# Patient Record
Sex: MALE | Race: WHITE | NOT HISPANIC OR LATINO | Employment: FULL TIME | URBAN - METROPOLITAN AREA
[De-identification: names, ages, dates, MRNs, and addresses within clinical notes are randomized per-mention and may not be internally consistent; named-entity substitution may affect disease eponyms.]

---

## 2017-01-31 ENCOUNTER — OFFICE VISIT (OUTPATIENT)
Dept: NEUROLOGY | Facility: MEDICAL CENTER | Age: 42
End: 2017-01-31
Payer: COMMERCIAL

## 2017-01-31 VITALS
HEART RATE: 69 BPM | DIASTOLIC BLOOD PRESSURE: 78 MMHG | BODY MASS INDEX: 30.88 KG/M2 | TEMPERATURE: 98.4 F | SYSTOLIC BLOOD PRESSURE: 116 MMHG | OXYGEN SATURATION: 97 % | HEIGHT: 72 IN | WEIGHT: 228 LBS

## 2017-01-31 DIAGNOSIS — G47.30 SLEEP APNEA, UNSPECIFIED TYPE: ICD-10-CM

## 2017-01-31 DIAGNOSIS — G43.109 MIGRAINE WITH AURA AND WITHOUT STATUS MIGRAINOSUS, NOT INTRACTABLE: ICD-10-CM

## 2017-01-31 PROCEDURE — 99205 OFFICE O/P NEW HI 60 MIN: CPT | Performed by: PHYSICIAN ASSISTANT

## 2017-01-31 RX ORDER — PROMETHAZINE HYDROCHLORIDE 25 MG/1
TABLET ORAL
Qty: 20 TAB | Refills: 6 | Status: SHIPPED | OUTPATIENT
Start: 2017-01-31 | End: 2019-07-01

## 2017-01-31 RX ORDER — NAPROXEN 500 MG/1
TABLET ORAL
Qty: 20 TAB | Refills: 6 | Status: SHIPPED | OUTPATIENT
Start: 2017-01-31 | End: 2019-07-01

## 2017-01-31 NOTE — MR AVS SNAPSHOT
Buddy Dash   2017 10:40 AM   Office Visit   MRN: [id number]    Department:  Neurology [facility]   Dept Phone:  [phone]    Description:  Male : 1975   Provider:  Thania Swift PA-C           Reason for Visit     New Patient migraine      Allergies as of 2017     Allergen Noted Reactions    Nkda [No Known Drug Allergy] 2012         Vital Signs     Blood Pressure Pulse Temperature Height Weight Body Mass Index    116/78 mmHg 69 36.9 °C (98.4 °F) 1.829 m (6') 103.42 kg (228 lb) 30.92 kg/m2    Oxygen Saturation Smoking Status                97% Never Smoker           Basic Information     Date Of Birth Sex Race Ethnicity Preferred Language    1975 Male White Non- English      Your appointments     Mar 30, 2017 11:00 AM   Follow Up Visit with Thania Swift PA-C   [facility] (--)    [address], [address]  [facility] [address] [phone]           You will be receiving a confirmation call a few days before your appointment from our automated call confirmation system.              Problem List              ICD-10-CM Priority Class Noted - Resolved    Thoracic or lumbosacral neuritis or radiculitis, unspecified [id number]   2012 - Present    S/P spinal surgery Z98.890   2012 - Present    Radiculopathy M54.10   2012 - Present      Health Maintenance     Patient has no pending health maintenance at this time      Current Immunizations     No immunizations on file.      Below and/or attached are the medications your provider expects you to take. Review all of your home medications and newly ordered medications with your provider and/or pharmacist. Follow medication instructions as directed by your provider and/or pharmacist. Please keep your medication list with you and share with your provider. Update the information when medications are discontinued, doses are changed, or new medications (including over-the-counter products) are  added; and carry medication information at all times in the event of emergency situations     Allergies:  NKDA - (reactions not documented)               Medications  Valid as of: January 31, 2017 - 12:03 PM    Generic Name Brand Name Tablet Size Instructions for use    Cyclobenzaprine HCl (Tab) FLEXERIL 5 MG Take 1-2 Tabs by mouth 3 times a day as needed for Muscle Spasms.        Famotidine (Tab) PEPCID 20 MG Take 1 Tab by mouth 2 times a day. Take while on sterioids        Hydrocodone-Acetaminophen (Tab) NORCO 7.5-325 MG Take 1-2 Tabs by mouth every four hours as needed ((Pain scale 4-6)).        Magnesium Hydroxide (Suspension) MILK OF MAGNESIA 400 MG/5ML Take 30 mL by mouth 1 time daily as needed (If no BM ).        Multiple Vitamins-Minerals (Tab) THERAGRAN-M  Take 1 Tab by mouth every day.        Sennosides-Docusate Sodium (Tab) PERICOLACE or SENOKOT S 8.6-50 MG Take 2 Tabs by mouth every day.        SUMAtriptan Succinate (Tab) IMITREX 50 MG Take 1 Tab by mouth every 8 hours as needed for Migraine.        .                 Medicines prescribed today were sent to:     None      Medication refill instructions:       If your prescription bottle indicates you have medication refills left, it is not necessary to call your provider’s office. Please contact your pharmacy and they will refill your medication.    If your prescription bottle indicates you do not have any refills left, you may request refills at any time through one of the following ways: The online [employer] system (except Urgent Care), by calling your provider’s office, or by asking your pharmacy to contact your provider’s office with a refill request. Medication refills are processed only during regular business hours and may not be available until the next business day. Your provider may request additional information or to have a follow-up visit with you prior to refilling your medication.   *Please Note: Medication refills are assigned a new Rx  number when refilled electronically. Your pharmacy may indicate that no refills were authorized even though a new prescription for the same medication is available at the pharmacy. Please request the medicine by name with the pharmacy before contacting your provider for a refill.        Instructions    Plan:    Acute/Rescue Medications:  Triptan - onzetra at start of headache, can repeat once 2 hours later   Nausea medication -  Phenergan   NSAID  - naproxen 500 mg available to take with triptan or without    Daily Preventative Treatment:  Vitamins - handout provided  Daily medicine - will consider later if > 2 days weekly  Exercise program:  Walking/yoga    Other:  ONB - handout provided.    (Refer to dentist for TMJ evaluation)  Referral to pulmonolgy for possible sleep apnea            NMRKT Access Code: [email or web address]  Expires: 3/2/2017 10:33 AM    Your email address is not on file at [employer].  Email Addresses are required for you to sign up for NMRKT, please contact [phone] to verify your personal information and to provide your email address prior to attempting to register for NMRKT.    Buddy Dash  [address] [place] [address]    NMRKT  A secure, online tool to manage your health information     [employer]’s NMRKT® is a secure, online tool that connects you to your personalized health information from the privacy of your home -- day or night - making it very easy for you to manage your healthcare. Once the activation process is completed, you can even access your medical information using the NMRKT chiara, which is available for free in the [employer] Chi[employer] store or Google Play store.     To learn more about NMRKT, visit [email or web address]    There are two levels of access available (as shown below):   My Chart Features  [facility]  Specialists [facility]  Urgent  Care Non-[facility]   Email your healthcare team securely and  privately 24/7 X X X    Manage appointments: schedule your next appointment; view details of past/upcoming appointments X      Request prescription refills. X      View recent personal medical records, including lab and immunizations X X X X   View health record, including health history, allergies, medications X X X X   Read reports about your outpatient visits, procedures, consult and ER notes X X X X   See your discharge summary, which is a recap of your hospital and/or ER visit that includes your diagnosis, lab results, and care plan X X  X     How to register for [employer]:  Once your e-mail address has been verified, follow the following steps to sign up for [employer].     1. Go to  [email or web address]  2. Click on the Sign Up Now box, which takes you to the New Member Sign Up page. You will need to provide the following information:  a. Enter your [employer] Access Code exactly as it appears at the top of this page. (You will not need to use this code after you’ve completed the sign-up process. If you do not sign up before the expiration date, you must request a new code.)   b. Enter your date of birth.   c. Enter your home email address.   d. Click Submit, and follow the next screen’s instructions.  3. Create a [employer] ID. This will be your [employer] login ID and cannot be changed, so think of one that is secure and easy to remember.  4. Create a [employer] password. You can change your password at any time.  5. Enter your Password Reset Question and Answer. This can be used at a later time if you forget your password.   6. Enter your e-mail address. This allows you to receive e-mail notifications when new information is available in [employer].  7. Click Sign Up. You can now view your health information.    For assistance activating your [employer] account, call [phone]

## 2017-01-31 NOTE — Clinical Note
Can u call pt and find out pharmacy and then call or fax rx x 3 there - they should all be printed off.  Thanks, N.

## 2017-01-31 NOTE — PATIENT INSTRUCTIONS
Plan:    Acute/Rescue Medications:  Triptan - onzetra at start of headache, can repeat once 2 hours later   Nausea medication -  Phenergan   NSAID  - naproxen 500 mg available to take with triptan or without    Daily Preventative Treatment:  Vitamins - handout provided  Daily medicine - will consider later if > 2 days weekly  Exercise program:  Walking/yoga    Other:  ONB - handout provided.    (Refer to dentist for TMJ evaluation)  Referral to pulmonolgy for possible sleep apnea
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
## 2017-02-24 ENCOUNTER — SLEEP CENTER VISIT (OUTPATIENT)
Dept: SLEEP MEDICINE | Facility: MEDICAL CENTER | Age: 42
End: 2017-02-24
Payer: COMMERCIAL

## 2017-02-24 VITALS
RESPIRATION RATE: 16 BRPM | OXYGEN SATURATION: 96 % | BODY MASS INDEX: 31.15 KG/M2 | DIASTOLIC BLOOD PRESSURE: 88 MMHG | SYSTOLIC BLOOD PRESSURE: 132 MMHG | HEART RATE: 60 BPM | HEIGHT: 72 IN | TEMPERATURE: 98.1 F | WEIGHT: 230 LBS

## 2017-02-24 DIAGNOSIS — G43.109 MIGRAINE WITH AURA AND WITHOUT STATUS MIGRAINOSUS, NOT INTRACTABLE: ICD-10-CM

## 2017-02-24 DIAGNOSIS — G47.33 OSA (OBSTRUCTIVE SLEEP APNEA): ICD-10-CM

## 2017-02-24 PROCEDURE — 99204 OFFICE O/P NEW MOD 45 MIN: CPT | Performed by: NURSE PRACTITIONER

## 2017-02-24 RX ORDER — AMOXICILLIN AND CLAVULANATE POTASSIUM 875; 125 MG/1; MG/1
1 TABLET, FILM COATED ORAL
Refills: 0 | COMMUNITY
Start: 2016-12-12 | End: 2017-11-16

## 2017-02-24 NOTE — PROGRESS NOTES
Chief Complaint   Patient presents with   • New Patient         HPI: This patient is a 41 y.o. male, who presents for evaluation and management of suspected sleep-disordered breathing. He was recently seen in consultation for chronic migraine headaches with Thania VALENTINO, questioning during that visit elicited possible symptoms of sleep apnea. Patient is a never smoker, no prescription drug use, one alcoholic beverage per week 1-2 cups of coffee per day. only other medical history is back pain and bruxism. He has no pulmonary or cardiac history. Sleep symptoms include restlessness at night, non-restful sleep, daytime fatigue. He's been told that he snores and stops breathing at night. He complains of morning headaches. He nods off easily during quiet moments of the day. He typically goes to bed about 10 PM and wakes at 4:30 AM for work.  He falls asleep fairly quickly but wakes frequently throughout the night. He denies symptoms of narcolepsy or cataplexy. ESS 13. His wife notes his snoring is worse when he gains weight.    Past Medical History   Diagnosis Date   • Other specified disorder of intestines      constipation   • Urinary bladder disorder      low flow   • Pain      back   • Back pain    • Bruxism    • Sleep apnea        Social History   Substance Use Topics   • Smoking status: Never Smoker    • Smokeless tobacco: Never Used   • Alcohol Use: Yes      Comment: rare        Family History   Problem Relation Age of Onset   • Sleep Apnea Mother    • Hypertension Paternal Grandmother        Current medications as of today   Current Outpatient Prescriptions   Medication Sig Dispense Refill   • SUMAtriptan Succinate (ONZETRA XSAIL) 11 MG/NOSEPC Exhaler Powder Spray 1 Dose in nose as needed. 6 Each 3   • naproxen (NAPROSYN) 500 MG Tab One tablet every 8 hours for severe migraine. 20 Tab 6   • promethazine (PHENERGAN) 25 MG Tab 25 mg phenergan once on day with severe migraine.  Will cause sleepiness.  Do not  drive. 20 Tab 6   • amoxicillin-clavulanate (AUGMENTIN) 875-125 MG Tab Take 1 Tab by mouth. FOR 7 DAYS  0   • cyclobenzaprine (FLEXERIL) 5 MG tablet Take 1-2 Tabs by mouth 3 times a day as needed for Muscle Spasms. 40 Tab 0   • hydrocodone-acetaminophen (NORCO) 7.5-325 MG per tablet Take 1-2 Tabs by mouth every four hours as needed ((Pain scale 4-6)). 90 Tab 0   • magnesium hydroxide (MILK OF MAGNESIA) 400 MG/5ML SUSP Take 30 mL by mouth 1 time daily as needed (If no BM ).     • senna-docusate (PERICOLACE OR SENOKOT S) 8.6-50 MG TABS Take 2 Tabs by mouth every day.     • sumatriptan (IMITREX) 50 MG TABS Take 1 Tab by mouth every 8 hours as needed for Migraine. 30 Each 0   • famotidine (PEPCID) 20 MG TABS Take 1 Tab by mouth 2 times a day. Take while on sterioids 10 Each 0   • therapeutic multivitamin-minerals (THERAGRAN-M) TABS Take 1 Tab by mouth every day.       No current facility-administered medications for this visit.       Allergies: Nkda    Blood pressure 132/88, pulse 60, temperature 36.7 °C (98.1 °F), resp. rate 16, height 1.829 m (6'), weight 104.327 kg (230 lb), SpO2 96 %.      ROS:   Constitutional: Denies fevers, chills, night sweats, weight loss. Positive for fatigue.  HEENT: Denies earache, difficulty hearing, tinnitus, nasal congestion, hoarseness  Cardiovascular: Denies chest pain, tightness, palpitations, orthopnea or edema  Respiratory: Denies cough, wheeze, dyspnea, hemoptysis  Sleep: See HPI  GI: Denies heartburn, dysphagia, nausea, abdominal pain, diarrhea or constipation  : Denies frequent urination, hematuria, discharge or painful urination  Musculoskeletal: Denies back pain, painful joints, sore muscles  Neurological: Denies weakness or headaches  Skin: No rashes    Physical exam:   Appearance: Well-nourished, well-developed, in no acute distress  HEENT: Normocephalic, atraumatic, white sclera, PERRLA  Respiratory: no intercostal retractions or accessory muscle use   Lungs auscultation:  Clear to auscultation bilaterally  Cardiovascular: Regular rate rhythm no murmurs, rubs or gallops  Gait: Normal  Digits: No clubbing, cyanosis  Motor: No focal deficits  Orientation: Oriented to time, person and place    Diagnosis:  1. MILLA (obstructive sleep apnea)  POLYSOMNOGRAPHY, 4 OR MORE   2. Migraine with aura and without status migrainosus, not intractable     3. BMI 31.0-31.9,adult         Plan:  I had a detailed discussion with the patient and his wife today regarding the pathophysiology of obstructive sleep apnea as well as potential cardiac and neurologic risks associated with untreated sleep apnea. Nocturnal hypoxemia at night secondary to undiagnosed sleep apnea could be contributing to his chronic migraines. We discussed treatment options including CPAP, dental appliance, ENT referral. He is not interested in surgery. His mother has CPAP so he is somewhat familiar with this.    1. Diagnostic PSG ASAP  2. Follow-up after sleep study to review

## 2017-02-24 NOTE — MR AVS SNAPSHOT
Buddy Dash   2017 11:20 AM   Sleep Center Visit   MRN: [id number]    Department:  [facility]   Dept Phone:  [phone]    Description:  Male : 1975   Provider:  TITO Miller           Reason for Visit     New Patient           Allergies as of 2017     Allergen Noted Reactions    Nkda [No Known Drug Allergy] 2012         You were diagnosed with     MILLA (obstructive sleep apnea)   [142559]       Migraine with aura and without status migrainosus, not intractable   [292958]       BMI 31.0-31.9,adult   [083248]         Vital Signs     Blood Pressure Pulse Temperature Respirations Height Weight    132/88 mmHg 60 36.7 °C (98.1 °F) 16 1.829 m (6') 104.327 kg (230 lb)    Body Mass Index Oxygen Saturation Smoking Status             31.19 kg/m2 96% Never Smoker          Basic Information     Date Of Birth Sex Race Ethnicity Preferred Language    1975 Male White Non- English      Your appointments     Mar 30, 2017 11:00 AM   Follow Up Visit with Thania Swift PA-C   [facility] (--)    [address], [address]  [employer] [id number] [phone]           You will be receiving a confirmation call a few days before your appointment from our automated call confirmation system.            2017  8:00 PM   Sleep Study Diagnostic with SLEEP TECH   [facility] (--)    [address]  [facility]  [employer] [id number] [phone]            2017 10:40 AM   Follow UP with TITO Miller   [facility] (--)    [address]  [facility]  [employer] [id number] [phone]              Problem List              ICD-10-CM Priority Class Noted - Resolved    Thoracic or lumbosacral neuritis or radiculitis, unspecified [id number]   2012 - Present    S/P spinal surgery Z98.890   2012 - Present    Radiculopathy M54.10   2012 - Present    Migraine with  aura and without status migrainosus, not intractable G43.109   1/31/2017 - Present    MILLA (obstructive sleep apnea)-presumed G47.33   2/24/2017 - Present    BMI 31.0-31.9,adult Z68.31   2/24/2017 - Present      Health Maintenance        Date Due Completion Dates    IMM DTaP/Tdap/Td Vaccine (1 - Tdap) 2/28/1994 ---    IMM INFLUENZA (1) 9/1/2016 ---            Current Immunizations     No immunizations on file.      Below and/or attached are the medications your provider expects you to take. Review all of your home medications and newly ordered medications with your provider and/or pharmacist. Follow medication instructions as directed by your provider and/or pharmacist. Please keep your medication list with you and share with your provider. Update the information when medications are discontinued, doses are changed, or new medications (including over-the-counter products) are added; and carry medication information at all times in the event of emergency situations     Allergies:  NKDA - (reactions not documented)               Medications  Valid as of: February 24, 2017 - 11:59 AM    Generic Name Brand Name Tablet Size Instructions for use    Amoxicillin-Pot Clavulanate (Tab) AUGMENTIN 875-125 MG Take 1 Tab by mouth. FOR 7 DAYS        Cyclobenzaprine HCl (Tab) FLEXERIL 5 MG Take 1-2 Tabs by mouth 3 times a day as needed for Muscle Spasms.        Famotidine (Tab) PEPCID 20 MG Take 1 Tab by mouth 2 times a day. Take while on sterioids        Hydrocodone-Acetaminophen (Tab) NORCO 7.5-325 MG Take 1-2 Tabs by mouth every four hours as needed ((Pain scale 4-6)).        Magnesium Hydroxide (Suspension) MILK OF MAGNESIA 400 MG/5ML Take 30 mL by mouth 1 time daily as needed (If no BM ).        Multiple Vitamins-Minerals (Tab) THERAGRAN-M  Take 1 Tab by mouth every day.        Naproxen (Tab) NAPROSYN 500 MG One tablet every 8 hours for severe migraine.        Promethazine HCl (Tab) PHENERGAN 25 MG 25 mg phenergan once on day  with severe migraine.  Will cause sleepiness.  Do not drive.        Sennosides-Docusate Sodium (Tab) PERICOLACE or SENOKOT S 8.6-50 MG Take 2 Tabs by mouth every day.        SUMAtriptan Succinate (Tab) IMITREX 50 MG Take 1 Tab by mouth every 8 hours as needed for Migraine.        SUMAtriptan Succinate (Exhaler Powder) SUMAtriptan Succinate 11 MG/NOSEPC Spray 1 Dose in nose as needed.        .                 Medicines prescribed today were sent to:     [facility], [place] - [address] [facility]    [address] [facility] [address]    Phone: [phone] Fax: [phone]    Open 24 Hours?: No      Medication refill instructions:       If your prescription bottle indicates you have medication refills left, it is not necessary to call your provider’s office. Please contact your pharmacy and they will refill your medication.    If your prescription bottle indicates you do not have any refills left, you may request refills at any time through one of the following ways: The online [employer] system (except Urgent Care), by calling your provider’s office, or by asking your pharmacy to contact your provider’s office with a refill request. Medication refills are processed only during regular business hours and may not be available until the next business day. Your provider may request additional information or to have a follow-up visit with you prior to refilling your medication.   *Please Note: Medication refills are assigned a new Rx number when refilled electronically. Your pharmacy may indicate that no refills were authorized even though a new prescription for the same medication is available at the pharmacy. Please request the medicine by name with the pharmacy before contacting your provider for a refill.        Your To Do List     Future Labs/Procedures Complete By Expires    POLYSOMNOGRAPHY, 4 OR MORE  As directed 2/24/2018      Instructions    1. Diagnostic sleep study ASAP  2.  Follow-up with me to review results          ContentRealtime Access Code: [email or web address]  Expires: 3/2/2017 10:33 AM    Your email address is not on file at [facility].  Email Addresses are required for you to sign up for ContentRealtime, please contact [phone] to verify your personal information and to provide your email address prior to attempting to register for ContentRealtime.    Buddy Dash  [address] [place] [address]    ContentRealtime  A secure, online tool to manage your health information     [facility]’s ContentRealtime® is a secure, online tool that connects you to your personalized health information from the privacy of your home -- day or night - making it very easy for you to manage your healthcare. Once the activation process is completed, you can even access your medical information using the ContentRealtime chiara, which is available for free in the Apple Chiara store or Google Play store.     To learn more about ContentRealtime, visit [email or web address]    There are two levels of access available (as shown below):   My Chart Features  [facility] Primary Care Doctor [facility]  Specialists [facility]  Urgent  Care Non-[facility] Primary Care Doctor   Email your healthcare team securely and privately 24/7 X X X    Manage appointments: schedule your next appointment; view details of past/upcoming appointments X      Request prescription refills. X      View recent personal medical records, including lab and immunizations X X X X   View health record, including health history, allergies, medications X X X X   Read reports about your outpatient visits, procedures, consult and ER notes X X X X   See your discharge summary, which is a recap of your hospital and/or ER visit that includes your diagnosis, lab results, and care plan X X  X     How to register for ContentRealtime:  Once your e-mail address has been verified, follow the following steps to sign up for ContentRealtime.     1. Go to  [email or web address]  2. Click on the Sign Up Now box, which  takes you to the New Member Sign Up page. You will need to provide the following information:  a. Enter your [employer] Chip Access Code exactly as it appears at the top of this page. (You will not need to use this code after you’ve completed the sign-up process. If you do not sign up before the expiration date, you must request a new code.)   b. Enter your date of birth.   c. Enter your home email address.   d. Click Submit, and follow the next screen’s instructions.  3. Create a Q Chip ID. This will be your Q Chip login ID and cannot be changed, so think of one that is secure and easy to remember.  4. Create a Q Chip password. You can change your password at any time.  5. Enter your Password Reset Question and Answer. This can be used at a later time if you forget your password.   6. Enter your e-mail address. This allows you to receive e-mail notifications when new information is available in Q Chip.  7. Click Sign Up. You can now view your health information.    For assistance activating your Q Chip account, call [phone]

## 2017-03-07 ENCOUNTER — RX ONLY (OUTPATIENT)
Age: 42
Setting detail: RX ONLY
End: 2017-03-07

## 2017-03-21 PROBLEM — D49.2 NEOPLASM OF UNSPECIFIED BEHAVIOR OF BONE, SOFT TISSUE, AND SKIN: Status: RESOLVED | Noted: 2017-03-07 | Resolved: 2017-03-21

## 2017-03-30 ENCOUNTER — APPOINTMENT (OUTPATIENT)
Dept: NEUROLOGY | Facility: MEDICAL CENTER | Age: 42
End: 2017-03-30
Payer: COMMERCIAL

## 2017-04-21 ENCOUNTER — SLEEP STUDY (OUTPATIENT)
Dept: SLEEP MEDICINE | Facility: MEDICAL CENTER | Age: 42
End: 2017-04-21
Attending: NURSE PRACTITIONER
Payer: COMMERCIAL

## 2017-04-21 DIAGNOSIS — G47.33 OSA (OBSTRUCTIVE SLEEP APNEA): ICD-10-CM

## 2017-04-21 PROCEDURE — 95811 POLYSOM 6/>YRS CPAP 4/> PARM: CPT | Performed by: INTERNAL MEDICINE

## 2017-04-21 NOTE — MR AVS SNAPSHOT
Buddy Dash   2017 8:00 PM   Sleep Study   MRN: [id number]    Department:  [facility]   Dept Phone:  [phone]    Description:  Male : 1975   Provider:  Naif Gee M.D.           Allergies as of 2017     Allergen Noted Reactions    Nkda [No Known Drug Allergy] 2012         You were diagnosed with     MILLA (obstructive sleep apnea)   [338270]         Vital Signs     Smoking Status                   Never Smoker            Basic Information     Date Of Birth Sex Race Ethnicity Preferred Language    1975 Male White Non- English      Your appointments     2017 10:40 AM   Follow UP with TITO Miller   [facility] (--)    [address]  [place] [address]   [phone]              Problem List              ICD-10-CM Priority Class Noted - Resolved    Thoracic or lumbosacral neuritis or radiculitis, unspecified [id number]   2012 - Present    S/P spinal surgery Z98.890   2012 - Present    Radiculopathy M54.10   2012 - Present    Migraine with aura and without status migrainosus, not intractable G43.109   2017 - Present    MILLA (obstructive sleep apnea)-presumed G47.33   2017 - Present    BMI 31.0-31.9,adult Z68.31   2017 - Present      Health Maintenance        Date Due Completion Dates    IMM DTaP/Tdap/Td Vaccine (1 - Tdap) 1994 ---            Current Immunizations     No immunizations on file.      Below and/or attached are the medications your provider expects you to take. Review all of your home medications and newly ordered medications with your provider and/or pharmacist. Follow medication instructions as directed by your provider and/or pharmacist. Please keep your medication list with you and share with your provider. Update the information when medications are discontinued, doses are changed, or new medications (including over-the-counter products) are  added; and carry medication information at all times in the event of emergency situations     Allergies:  NKDA - (reactions not documented)               Medications  Valid as of: April 22, 2017 -  6:15 AM    Generic Name Brand Name Tablet Size Instructions for use    Amoxicillin-Pot Clavulanate (Tab) AUGMENTIN 875-125 MG Take 1 Tab by mouth. FOR 7 DAYS        Cyclobenzaprine HCl (Tab) FLEXERIL 5 MG Take 1-2 Tabs by mouth 3 times a day as needed for Muscle Spasms.        Famotidine (Tab) PEPCID 20 MG Take 1 Tab by mouth 2 times a day. Take while on sterioids        Hydrocodone-Acetaminophen (Tab) NORCO 7.5-325 MG Take 1-2 Tabs by mouth every four hours as needed ((Pain scale 4-6)).        Magnesium Hydroxide (Suspension) MILK OF MAGNESIA 400 MG/5ML Take 30 mL by mouth 1 time daily as needed (If no BM ).        Multiple Vitamins-Minerals (Tab) THERAGRAN-M  Take 1 Tab by mouth every day.        Naproxen (Tab) NAPROSYN 500 MG One tablet every 8 hours for severe migraine.        Promethazine HCl (Tab) PHENERGAN 25 MG 25 mg phenergan once on day with severe migraine.  Will cause sleepiness.  Do not drive.        Sennosides-Docusate Sodium (Tab) PERICOLACE or SENOKOT S 8.6-50 MG Take 2 Tabs by mouth every day.        SUMAtriptan Succinate (Tab) IMITREX 50 MG Take 1 Tab by mouth every 8 hours as needed for Migraine.        SUMAtriptan Succinate (Exhaler Powder) SUMAtriptan Succinate 11 MG/NOSEPC Spray 1 Dose in nose as needed.        .                 Medicines prescribed today were sent to:     [facility] [place], [place] - [address] [facility]    [address] [facility] [address]    Phone: [phone] Fax: [phone]    Open 24 Hours?: No      Medication refill instructions:       If your prescription bottle indicates you have medication refills left, it is not necessary to call your provider’s office. Please contact your pharmacy and they will refill your medication.    If your prescription  bottle indicates you do not have any refills left, you may request refills at any time through one of the following ways: The online Huoli system (except Urgent Care), by calling your provider’s office, or by asking your pharmacy to contact your provider’s office with a refill request. Medication refills are processed only during regular business hours and may not be available until the next business day. Your provider may request additional information or to have a follow-up visit with you prior to refilling your medication.   *Please Note: Medication refills are assigned a new Rx number when refilled electronically. Your pharmacy may indicate that no refills were authorized even though a new prescription for the same medication is available at the pharmacy. Please request the medicine by name with the pharmacy before contacting your provider for a refill.           Huoli Access Code: [email or web address]  Expires: 5/2/2017 10:32 AM    Your email address is not on file at [facility].  Email Addresses are required for you to sign up for Huoli, please contact [phone] to verify your personal information and to provide your email address prior to attempting to register for Huoli.    Buddy Dash  [address] [place] [address]    Huoli  A secure, online tool to manage your health information     [facility]’s Huoli® is a secure, online tool that connects you to your personalized health information from the privacy of your home -- day or night - making it very easy for you to manage your healthcare. Once the activation process is completed, you can even access your medical information using the Huoli chiara, which is available for free in the Apple Chiara store or Google Play store.     To learn more about Huoli, visit [email or web address]    There are two levels of access available (as shown below):   My Chart Features  [facility] Primary Care Doctor [facility]  Specialists  [facility]  Urgent  Care Non-[facility] Primary Care Doctor   Email your healthcare team securely and privately 24/7 X X X    Manage appointments: schedule your next appointment; view details of past/upcoming appointments X      Request prescription refills. X      View recent personal medical records, including lab and immunizations X X X X   View health record, including health history, allergies, medications X X X X   Read reports about your outpatient visits, procedures, consult and ER notes X X X X   See your discharge summary, which is a recap of your hospital and/or ER visit that includes your diagnosis, lab results, and care plan X X  X     How to register for M-KOPA:  Once your e-mail address has been verified, follow the following steps to sign up for M-KOPA.     1. Go to  [email or web address]  2. Click on the Sign Up Now box, which takes you to the New Member Sign Up page. You will need to provide the following information:  a. Enter your M-KOPA Access Code exactly as it appears at the top of this page. (You will not need to use this code after you’ve completed the sign-up process. If you do not sign up before the expiration date, you must request a new code.)   b. Enter your date of birth.   c. Enter your home email address.   d. Click Submit, and follow the next screen’s instructions.  3. Create a M-KOPA ID. This will be your M-KOPA login ID and cannot be changed, so think of one that is secure and easy to remember.  4. Create a M-KOPA password. You can change your password at any time.  5. Enter your Password Reset Question and Answer. This can be used at a later time if you forget your password.   6. Enter your e-mail address. This allows you to receive e-mail notifications when new information is available in M-KOPA.  7. Click Sign Up. You can now view your health information.    For assistance activating your M-KOPA account, call [phone]

## 2017-04-24 NOTE — PROCEDURES
CLINICAL COMMENTS:  The patient underwent a split night polysomnogram with a CPAP titration using the standard montage for measurement of parameters of sleep, respiratory events, movement abnormalities, heart rate and rhythm. A microphone was used to monitor snoring.    INTERPRETATION: The diagnostic recording time was 157.0 minutes with a sleep period of 153.6 minutes.  Total sleep time was 134.1 minutes with a sleep efficiency of 85.4%.  The sleep latency was 3.5 minutes, and REM latency was 133.0 minutes.  The patient had 83 arousals in total, for an arousal index of 37.1.        RESPIRATORY: The patient had 1 apneas in total.  Of these, 1 were obstructive apneas, and 0 were central apneas.  This resulted in an apnea index (AI) of 0.4.  The patient had 154 hypopneas in total, which resulted in a hypopnea index of 68.9.  The overall AHI was 69.4, while the AHI during REM was 80.0.  The supine AHI = 111.1.    OXIMETRY: Oxygen saturation monitoring showed a mean SpO2 of 89.7% for the diagnostic part of the study, with a minimum oxygen saturation of 76.0%.  Oxygen saturations were below 89% for 36.8% of sleep time.    CARDIAC: The highest heart rate for the first part of the study was 98.0 beats per minute.  The average heart rate during sleep was 69.2 bpm, while the highest heart rate was 95.0 bpm.    LIMB MOVEMENTS: There were a total of 113 periodic limb movements during sleep, of which 6 were PLMS arousals.  This resulted in a PLMS index of 50.6 and a PLMS arousal index of 2.7.    TREATMENT    Treatment recording time was 338.4 minutes with a total sleep time of 292.4min.  The patient had an arousal index of 7.4.      RESPIRATORY: The patient had 0 obstructive apneas, 3 central apneas, and 44 hypopneas for an overall AHI was 9.6.    OXIMETRY: The mean SpO2 during treatment was 92.0%, with a minimum oxygen saturation of 87.0%.      CPAP was tried from 5cm to [id number].    Impression:    This was an overnight  diagnostic/therapeutic polysomnogram.    The patient experienced a slightly reduced sleep efficiency to 85.4%, had one REM period, 19.5 minutes of wake after sleep onset, 38 minutes of stage I sleep, 59.6 minutes of stage II sleep, 23 minutes of stage III sleep, and 13.5 minutes of REM with a reduced sleep latency of 3.5 minutes. The patient had an elevated limb movement index of 50.6 but a limb movement with arousal index of only 2.7.    Severe obstructive sleep apnea hypopnea was found. The AHI was 69.4, the mean event duration 20.7 seconds, and the longest event lasted 53.2 seconds. REM and the supine position both aggravated the sleep disordered breathing. The REM index was 80.0. The supine sleep index was 112.1. The lowest saturation during sleep was 76.0% and saturations were less than 90% for 59.4% of the recording.    Once the patient met the split-night protocol, the technician performed a positive airway pressure titration. The patient did well event-wise on CPAP at 5 and 6 cm with normalized apnea hypopnea indices but not as well on higher pressures. On CPAP at 6 cm the patient was able to achieve REM sleep, had an AHI of 3.2, a minimum saturation of 87%, and a mean saturation of 91.7%.    Recommendation:    Recommend CPAP at 6 cm using a mask of choice and heated humidification followed by clinical and compliance card review in 8 weeks. Alternatively, auto titrating CPAP 5-12 cm is an option.

## 2017-04-26 ENCOUNTER — SLEEP CENTER VISIT (OUTPATIENT)
Dept: SLEEP MEDICINE | Facility: MEDICAL CENTER | Age: 42
End: 2017-04-26
Payer: COMMERCIAL

## 2017-04-26 VITALS
WEIGHT: 230 LBS | RESPIRATION RATE: 16 BRPM | TEMPERATURE: 98.8 F | HEART RATE: 65 BPM | SYSTOLIC BLOOD PRESSURE: 142 MMHG | HEIGHT: 72 IN | DIASTOLIC BLOOD PRESSURE: 90 MMHG | OXYGEN SATURATION: 95 % | BODY MASS INDEX: 31.15 KG/M2

## 2017-04-26 DIAGNOSIS — G47.33 OSA (OBSTRUCTIVE SLEEP APNEA): ICD-10-CM

## 2017-04-26 PROCEDURE — 99213 OFFICE O/P EST LOW 20 MIN: CPT | Performed by: NURSE PRACTITIONER

## 2017-04-26 NOTE — PATIENT INSTRUCTIONS
Sleep Apnea   Sleep apnea is a sleep disorder characterized by abnormal pauses in breathing while you sleep. When your breathing pauses, the level of oxygen in your blood decreases. This causes you to move out of deep sleep and into light sleep. As a result, your quality of sleep is poor, and the system that carries your blood throughout your body (cardiovascular system) experiences stress. If sleep apnea remains untreated, the following conditions can develop:  · High blood pressure (hypertension).  · Coronary artery disease.  · Inability to achieve or maintain an erection (impotence).  · Impairment of your thought process (cognitive dysfunction).  There are three types of sleep apnea:  1. Obstructive sleep apnea--Pauses in breathing during sleep because of a blocked airway.  2. Central sleep apnea--Pauses in breathing during sleep because the area of the brain that controls your breathing does not send the correct signals to the muscles that control breathing.  3. Mixed sleep apnea--A combination of both obstructive and central sleep apnea.  RISK FACTORS  The following risk factors can increase your risk of developing sleep apnea:  · Being overweight.  · Smoking.  · Having narrow passages in your nose and throat.  · Being of older age.  · Being male.  · Alcohol use.  · Sedative and tranquilizer use.  · Ethnicity. Among individuals younger than 35 years,  Americans are at increased risk of sleep apnea.  SYMPTOMS   · Difficulty staying asleep.  · Daytime sleepiness and fatigue.  · Loss of energy.  · Irritability.  · Loud, heavy snoring.  · Morning headaches.  · Trouble concentrating.  · Forgetfulness.  · Decreased interest in sex.  · Unexplained sleepiness.  DIAGNOSIS   In order to diagnose sleep apnea, your caregiver will perform a physical examination. A sleep study done in the comfort of your own home may be appropriate if you are otherwise healthy. Your caregiver may also recommend that you spend the  "night in a sleep lab. In the sleep lab, several monitors record information about your heart, lungs, and brain while you sleep. Your leg and arm movements and blood oxygen level are also recorded.  TREATMENT  The following actions may help to resolve mild sleep apnea:  · Sleeping on your side.    · Using a decongestant if you have nasal congestion.    · Avoiding the use of depressants, including alcohol, sedatives, and narcotics.    · Losing weight and modifying your diet if you are overweight.  There also are devices and treatments to help open your airway:  · Oral appliances. These are custom-made mouthpieces that shift your lower jaw forward and slightly open your bite. This opens your airway.  · Devices that create positive airway pressure. This positive pressure \"splints\" your airway open to help you breathe better during sleep. The following devices create positive airway pressure:  ¨ Continuous positive airway pressure (CPAP) device. The CPAP device creates a continuous level of air pressure with an air pump. The air is delivered to your airway through a mask while you sleep. This continuous pressure keeps your airway open.  ¨ Nasal expiratory positive airway pressure (EPAP) device. The EPAP device creates positive air pressure as you exhale. The device consists of single-use valves, which are inserted into each nostril and held in place by adhesive. The valves create very little resistance when you inhale but create much more resistance when you exhale. That increased resistance creates the positive airway pressure. This positive pressure while you exhale keeps your airway open, making it easier to breath when you inhale again.  ¨ Bilevel positive airway pressure (BPAP) device. The BPAP device is used mainly in patients with central sleep apnea. This device is similar to the CPAP device because it also uses an air pump to deliver continuous air pressure through a mask. However, with the BPAP machine, the " pressure is set at two different levels. The pressure when you exhale is lower than the pressure when you inhale.  · Surgery. Typically, surgery is only done if you cannot comply with less invasive treatments or if the less invasive treatments do not improve your condition. Surgery involves removing excess tissue in your airway to create a wider passage way.     This information is not intended to replace advice given to you by your health care provider. Make sure you discuss any questions you have with your health care provider.     Document Released: 12/08/2003 Document Revised: 01/08/2016 Document Reviewed: 04/25/2013  [employer] Interactive Patient Education ©2016 [employer]

## 2017-04-26 NOTE — PROGRESS NOTES
Chief Complaint   Patient presents with   • Apnea     Sleep study results       HPI:  Buddy Dash is a 42 y.o. year old male here today for follow-up on his Polysomnogram. He was referred by CHICHO Fuentes for evaluation and management of suspected sleep-disordered breathing. He has been undergoing work up for chronic Migraine headaches. Patient is a never smoker, no prescription drug use, one alcoholic beverage per week 1-2 cups of coffee per day. Only other medical history is back pain and bruxism. He has no pulmonary or cardiac history. Sleep symptoms include restlessness at night, non-restful sleep, daytime fatigue. He's been told that he snores and stops breathing at night. He complains of morning headaches. He nods off easily during quiet moments of the day. He typically goes to bed about 10 PM and wakes at 4:30 AM for work.  He falls asleep fairly quickly but wakes frequently throughout the night. He denies symptoms of narcolepsy or cataplexy. ESS 13. His wife notes his snoring is worse when he gains weight.  He states his Mother has sleep apnea and is on CPAP therapy.   Polysomnogram 4/21/2017 indicates an AHI of 69.4 with a low 02 saturation of 76% with 58.6% of the diagnostic portion spent with saturations less than 90%. He was titrated to CPAP pressure 5-10 CM H20. On a CPAP pressure of 6 CM his AHI was reduced to 3.2 with a mean 02 saturation of 91.7%.       Past Medical History   Diagnosis Date   • Other specified disorder of intestines      constipation   • Urinary bladder disorder      low flow   • Pain      back   • Back pain    • Bruxism    • Sleep apnea        Past Surgical History   Procedure Laterality Date   • Lumbar laminectomy diskectomy       left   • Lumbar laminectomy diskectomy  8/22/2012     Performed by WILLI ALVAREZ at SURGERY [facility] ORS   • Lumbar laminectomy diskectomy  8/31/2012     Performed by WILLI ALVAREZ at SURGERY [facility] ORS   • Laminotomy     •  Arthroscopy, knee         Family History   Problem Relation Age of Onset   • Sleep Apnea Mother    • Hypertension Paternal Grandmother        Social History     Social History   • Marital Status:      Spouse Name: N/A   • Number of Children: N/A   • Years of Education: N/A     Occupational History   • Not on file.     Social History Main Topics   • Smoking status: Never Smoker    • Smokeless tobacco: Never Used   • Alcohol Use: Yes      Comment: rare    • Drug Use: No   • Sexual Activity: Not on file     Other Topics Concern   • Not on file     Social History Narrative         ROS:  Constitutional: Denies fevers, chills, sweats, weight loss  Eyes: Denies glasses, vision loss, pain, drainage, double vision  Ears/Nose/Mouth/Throat: Denies rhinitis, nasal congestion, ear ache, difficulty hearing, sore throat, persistent hoarseness, decayed teeth/toothache  Cardiovascular: Denies chest pain, tightness, palpitations, swelling in feet/legs, fainting, difficulty breathing when laying down  Respiratory: Denies shortness of breath, cough, sputum, wheezing, painful breathing, coughing up blood  GI: Denies heartburn, difficulty swallowing, nausea, vomiting, abdominal pain, diarrhea, constipation  : Denies frequent urination, painful urination  Integumentary: Denies rashes, lumps or color changes  MSK: Positive back pain   Neurological: Denies dizziness, weakness. Positive for migraine headaches.   Sleep: See HPI       Current Outpatient Prescriptions on File Prior to Visit   Medication Sig Dispense Refill   • amoxicillin-clavulanate (AUGMENTIN) 875-125 MG Tab Take 1 Tab by mouth. FOR 7 DAYS  0   • SUMAtriptan Succinate (ONZETRA XSAIL) 11 MG/NOSEPC Exhaler Powder Spray 1 Dose in nose as needed. 6 Each 3   • naproxen (NAPROSYN) 500 MG Tab One tablet every 8 hours for severe migraine. 20 Tab 6   • promethazine (PHENERGAN) 25 MG Tab 25 mg phenergan once on day with severe migraine.  Will cause sleepiness.  Do not drive.  20 Tab 6   • cyclobenzaprine (FLEXERIL) 5 MG tablet Take 1-2 Tabs by mouth 3 times a day as needed for Muscle Spasms. 40 Tab 0   • hydrocodone-acetaminophen (NORCO) 7.5-325 MG per tablet Take 1-2 Tabs by mouth every four hours as needed ((Pain scale 4-6)). 90 Tab 0   • magnesium hydroxide (MILK OF MAGNESIA) 400 MG/5ML SUSP Take 30 mL by mouth 1 time daily as needed (If no BM ).     • senna-docusate (PERICOLACE OR SENOKOT S) 8.6-50 MG TABS Take 2 Tabs by mouth every day.     • sumatriptan (IMITREX) 50 MG TABS Take 1 Tab by mouth every 8 hours as needed for Migraine. 30 Each 0   • famotidine (PEPCID) 20 MG TABS Take 1 Tab by mouth 2 times a day. Take while on sterioids 10 Each 0   • therapeutic multivitamin-minerals (THERAGRAN-M) TABS Take 1 Tab by mouth every day.       No current facility-administered medications on file prior to visit.     Nkda    Blood pressure 142/90, pulse 65, temperature 37.1 °C (98.8 °F), resp. rate 16, height 1.829 m (6'), weight 104.327 kg (230 lb), SpO2 95 %.  PE:   Appearance: Well developed, well nourished, no acute distress  Eyes: PERRL, EOM intact, sclera white, conjunctiva moist  Ears: no lesions or deformities  Hearing: grossly intact  Nose: no lesions or deformities  Oropharynx: tongue normal, posterior pharynx without erythema or exudate  Mallampati Classification: Class 3  Neck: supple, trachea midline, no masses   Respiratory effort: no intercostal retractions or use of accessory muscles  Lung auscultation: no rales, rhonchi or wheezes  Heart auscultation: no murmur rub or gallop  Extremities: no cyanosis or edema  Abdomen: soft ,non tender, no masses  Gait and Station: normal  Digits and nails: no clubbing, cyanosis, petechiae or nodes.  Cranial nerves: grossly intact  Skin: no rashes, lesions or ulcers noted  Orientation: Oriented to time, person and place  Mood and affect: mood and affect appropriate, normal interaction with examiner  Judgement: Intact          Assessment:  1.  MILLA (obstructive sleep apnea)-presumed  DME CPAP   2. BMI 31.0-31.9,adult           Plan:      1) Reviewed sleep study in detail. Discussed pathophysiology of sleep apnea and various treatment options in detail. He is amendable to a trial of airway pressurization. Order for Auto CPAP at 5-12 CM H20. Handout provided on sleep apnea  2) Sleep hygiene discussed.   3) Weight loss recommended.   4) Follow up in 6 weeks with compliance card download, sooner if needed.

## 2017-04-26 NOTE — MR AVS SNAPSHOT
Buddy Dash   2017 11:40 AM   Sleep Center Visit   MRN: [id number]    Department:  [facility]   Dept Phone:  [phone]    Description:  Male : 1975   Provider:  Kaylen Dunn AJulienP.N.           Reason for Visit     Apnea Sleep study results      Allergies as of 2017     Allergen Noted Reactions    Nkda [No Known Drug Allergy] 2012         You were diagnosed with     MILLA (obstructive sleep apnea)   [996200]       BMI 31.0-31.9,adult   [220959]         Vital Signs     Blood Pressure Pulse Temperature Respirations Height Weight    142/90 mmHg 65 37.1 °C (98.8 °F) 16 1.829 m (6') 104.327 kg (230 lb)    Body Mass Index Oxygen Saturation Smoking Status             31.19 kg/m2 95% Never Smoker          Basic Information     Date Of Birth Sex Race Ethnicity Preferred Language    1975 Male White Non- English      Your appointments     2017  9:40 AM   Follow UP with KAYLEE MillerP.RSYLVIA   [facility] (--)    [address] [place] [address]  [place] [address]   [phone]              Problem List              ICD-10-CM Priority Class Noted - Resolved    Thoracic or lumbosacral neuritis or radiculitis, unspecified [id number]   2012 - Present    S/P spinal surgery Z98.890   2012 - Present    Radiculopathy M54.10   2012 - Present    Migraine with aura and without status migrainosus, not intractable G43.109   2017 - Present    MILLA (obstructive sleep apnea)-presumed G47.33   2017 - Present    BMI 31.0-31.9,adult Z68.31   2017 - Present      Health Maintenance        Date Due Completion Dates    IMM DTaP/Tdap/Td Vaccine (1 - Tdap) 1994 ---            Current Immunizations     No immunizations on file.      Below and/or attached are the medications your provider expects you to take. Review all of your home medications and newly ordered medications with your provider and/or pharmacist. Follow  medication instructions as directed by your provider and/or pharmacist. Please keep your medication list with you and share with your provider. Update the information when medications are discontinued, doses are changed, or new medications (including over-the-counter products) are added; and carry medication information at all times in the event of emergency situations     Allergies:  NKDA - (reactions not documented)               Medications  Valid as of: April 26, 2017 - 12:09 PM    Generic Name Brand Name Tablet Size Instructions for use    Amoxicillin-Pot Clavulanate (Tab) AUGMENTIN 875-125 MG Take 1 Tab by mouth. FOR 7 DAYS        Cyclobenzaprine HCl (Tab) FLEXERIL 5 MG Take 1-2 Tabs by mouth 3 times a day as needed for Muscle Spasms.        Famotidine (Tab) PEPCID 20 MG Take 1 Tab by mouth 2 times a day. Take while on sterioids        Hydrocodone-Acetaminophen (Tab) NORCO 7.5-325 MG Take 1-2 Tabs by mouth every four hours as needed ((Pain scale 4-6)).        Magnesium Hydroxide (Suspension) MILK OF MAGNESIA 400 MG/5ML Take 30 mL by mouth 1 time daily as needed (If no BM ).        Multiple Vitamins-Minerals (Tab) THERAGRAN-M  Take 1 Tab by mouth every day.        Naproxen (Tab) NAPROSYN 500 MG One tablet every 8 hours for severe migraine.        Promethazine HCl (Tab) PHENERGAN 25 MG 25 mg phenergan once on day with severe migraine.  Will cause sleepiness.  Do not drive.        Sennosides-Docusate Sodium (Tab) PERICOLACE or SENOKOT S 8.6-50 MG Take 2 Tabs by mouth every day.        SUMAtriptan Succinate (Tab) IMITREX 50 MG Take 1 Tab by mouth every 8 hours as needed for Migraine.        SUMAtriptan Succinate (Exhaler Powder) SUMAtriptan Succinate 11 MG/NOSEPC Spray 1 Dose in nose as needed.        .                 Medicines prescribed today were sent to:     [id number], [place] - [id number] [facility] [address]    Phone: [phone] Fax:  [phone]    Open 24 Hours?: No      Medication refill instructions:       If your prescription bottle indicates you have medication refills left, it is not necessary to call your provider’s office. Please contact your pharmacy and they will refill your medication.    If your prescription bottle indicates you do not have any refills left, you may request refills at any time through one of the following ways: The online [employer] system (except Urgent Care), by calling your provider’s office, or by asking your pharmacy to contact your provider’s office with a refill request. Medication refills are processed only during regular business hours and may not be available until the next business day. Your provider may request additional information or to have a follow-up visit with you prior to refilling your medication.   *Please Note: Medication refills are assigned a new Rx number when refilled electronically. Your pharmacy may indicate that no refills were authorized even though a new prescription for the same medication is available at the pharmacy. Please request the medicine by name with the pharmacy before contacting your provider for a refill.        Instructions    Sleep Apnea   Sleep apnea is a sleep disorder characterized by abnormal pauses in breathing while you sleep. When your breathing pauses, the level of oxygen in your blood decreases. This causes you to move out of deep sleep and into light sleep. As a result, your quality of sleep is poor, and the system that carries your blood throughout your body (cardiovascular system) experiences stress. If sleep apnea remains untreated, the following conditions can develop:  · High blood pressure (hypertension).  · Coronary artery disease.  · Inability to achieve or maintain an erection (impotence).  · Impairment of your thought process (cognitive dysfunction).  There are three types of sleep apnea:  1. Obstructive sleep apnea--Pauses in breathing during sleep  because of a blocked airway.  2. Central sleep apnea--Pauses in breathing during sleep because the area of the brain that controls your breathing does not send the correct signals to the muscles that control breathing.  3. Mixed sleep apnea--A combination of both obstructive and central sleep apnea.  RISK FACTORS  The following risk factors can increase your risk of developing sleep apnea:  · Being overweight.  · Smoking.  · Having narrow passages in your nose and throat.  · Being of older age.  · Being male.  · Alcohol use.  · Sedative and tranquilizer use.  · Ethnicity. Among individuals younger than 35 years,  Americans are at increased risk of sleep apnea.  SYMPTOMS   · Difficulty staying asleep.  · Daytime sleepiness and fatigue.  · Loss of energy.  · Irritability.  · Loud, heavy snoring.  · Morning headaches.  · Trouble concentrating.  · Forgetfulness.  · Decreased interest in sex.  · Unexplained sleepiness.  DIAGNOSIS   In order to diagnose sleep apnea, your caregiver will perform a physical examination. A sleep study done in the comfort of your own home may be appropriate if you are otherwise healthy. Your caregiver may also recommend that you spend the night in a sleep lab. In the sleep lab, several monitors record information about your heart, lungs, and brain while you sleep. Your leg and arm movements and blood oxygen level are also recorded.  TREATMENT  The following actions may help to resolve mild sleep apnea:  · Sleeping on your side.    · Using a decongestant if you have nasal congestion.    · Avoiding the use of depressants, including alcohol, sedatives, and narcotics.    · Losing weight and modifying your diet if you are overweight.  There also are devices and treatments to help open your airway:  · Oral appliances. These are custom-made mouthpieces that shift your lower jaw forward and slightly open your bite. This opens your airway.  · Devices that create positive airway pressure. This  "positive pressure \"splints\" your airway open to help you breathe better during sleep. The following devices create positive airway pressure:  ¨ Continuous positive airway pressure (CPAP) device. The CPAP device creates a continuous level of air pressure with an air pump. The air is delivered to your airway through a mask while you sleep. This continuous pressure keeps your airway open.  ¨ Nasal expiratory positive airway pressure (EPAP) device. The EPAP device creates positive air pressure as you exhale. The device consists of single-use valves, which are inserted into each nostril and held in place by adhesive. The valves create very little resistance when you inhale but create much more resistance when you exhale. That increased resistance creates the positive airway pressure. This positive pressure while you exhale keeps your airway open, making it easier to breath when you inhale again.  ¨ Bilevel positive airway pressure (BPAP) device. The BPAP device is used mainly in patients with central sleep apnea. This device is similar to the CPAP device because it also uses an air pump to deliver continuous air pressure through a mask. However, with the BPAP machine, the pressure is set at two different levels. The pressure when you exhale is lower than the pressure when you inhale.  · Surgery. Typically, surgery is only done if you cannot comply with less invasive treatments or if the less invasive treatments do not improve your condition. Surgery involves removing excess tissue in your airway to create a wider passage way.     This information is not intended to replace advice given to you by your health care provider. Make sure you discuss any questions you have with your health care provider.     Document Released: 12/08/2003 Document Revised: 01/08/2016 Document Reviewed: 04/25/2013  [email or web address] Interactive Patient Education ©2016 [employer]            [employer] Access Code: [email or web address]  Expires: 5/2/2017 10:32 " AM    Your email address is not on file at [facility].  Email Addresses are required for you to sign up for [employer], please contact [phone] to verify your personal information and to provide your email address prior to attempting to register for [employer].    Buddy Dash  [address] [place] [address]    [employer]  A secure, online tool to manage your health information     [facility]’s [employer] Experience® is a secure, online tool that connects you to your personalized health information from the privacy of your home -- day or night - making it very easy for you to manage your healthcare. Once the activation process is completed, you can even access your medical information using the I[employer] Experience chiara, which is available for free in the Apple Chiara store or Google Play store.     To learn more about [employer], visit [email or web address]    There are two levels of access available (as shown below):   My Chart Features  [facility] Doctor [facility]  Specialists [facility]  Urgent  Care Non-[facility] Primary Care Doctor   Email your healthcare team securely and privately 24/7 X X X    Manage appointments: schedule your next appointment; view details of past/upcoming appointments X      Request prescription refills. X      View recent personal medical records, including lab and immunizations X X X X   View health record, including health history, allergies, medications X X X X   Read reports about your outpatient visits, procedures, consult and ER notes X X X X   See your discharge summary, which is a recap of your hospital and/or ER visit that includes your diagnosis, lab results, and care plan X X  X     How to register for [employer]:  Once your e-mail address has been verified, follow the following steps to sign up for [employer].     1. Go to  [email or web address]  2. Click on the Sign Up Now box, which takes you to the New Member Sign Up page. You will need to provide the following information:  a. Enter your  [employer] Access Code exactly as it appears at the top of this page. (You will not need to use this code after you’ve completed the sign-up process. If you do not sign up before the expiration date, you must request a new code.)   b. Enter your date of birth.   c. Enter your home email address.   d. Click Submit, and follow the next screen’s instructions.  3. Create a [employer] ID. This will be your [employer] login ID and cannot be changed, so think of one that is secure and easy to remember.  4. Create a [employer] password. You can change your password at any time.  5. Enter your Password Reset Question and Answer. This can be used at a later time if you forget your password.   6. Enter your e-mail address. This allows you to receive e-mail notifications when new information is available in [employer].  7. Click Sign Up. You can now view your health information.    For assistance activating your [employer] account, call [phone]

## 2017-04-28 ENCOUNTER — APPOINTMENT (OUTPATIENT)
Dept: SLEEP MEDICINE | Facility: MEDICAL CENTER | Age: 42
End: 2017-04-28
Payer: COMMERCIAL

## 2017-06-23 ENCOUNTER — APPOINTMENT (OUTPATIENT)
Dept: SLEEP MEDICINE | Facility: MEDICAL CENTER | Age: 42
End: 2017-06-23
Payer: COMMERCIAL

## 2017-08-11 ENCOUNTER — SLEEP CENTER VISIT (OUTPATIENT)
Dept: SLEEP MEDICINE | Facility: MEDICAL CENTER | Age: 42
End: 2017-08-11
Payer: COMMERCIAL

## 2017-08-11 VITALS
WEIGHT: 230 LBS | DIASTOLIC BLOOD PRESSURE: 88 MMHG | HEART RATE: 63 BPM | RESPIRATION RATE: 16 BRPM | SYSTOLIC BLOOD PRESSURE: 118 MMHG | OXYGEN SATURATION: 96 % | BODY MASS INDEX: 31.15 KG/M2 | HEIGHT: 72 IN

## 2017-08-11 DIAGNOSIS — G47.33 OSA (OBSTRUCTIVE SLEEP APNEA): ICD-10-CM

## 2017-08-11 PROCEDURE — 99213 OFFICE O/P EST LOW 20 MIN: CPT | Performed by: NURSE PRACTITIONER

## 2017-08-11 NOTE — PROGRESS NOTES
HPI: This patient is a 42 y.o. male, who presents for a 6 week follow-up MILLA. Medical history includes bruxism, back pain, chronic migraine. Initially referred by Marsha VALENTINO for evaluation of MILLA in February of this year. Patient is a never smoker, no history of cardiac or pulmonary disease.    In regards to MILLA, PSG indicates sleep apnea with an AHI of 69.4, minimum oxygen saturation of 76 %. He was initiated on auto CPAP 5-12 cm H2O at his last visit in April. Compliance download over the past 30 days indicates 97 % compliance, average use of 6 hours per night, AHI of 0.3. Mask and pressures are comfortable. Patient reports resolution of his migraine since initiating CPAP. His wife notes that he has more energy, he is no longer napping. He feels he's getting a better quality of sleep. Mask leak will wake him up occasionally, he still adjusting to therapy. Overall he feels he is benefiting. He would like to try nasal pillows. He is also interested in exploring options for battery for his CPAP when he goes camping or a travel CPAP.    Past Medical History   Diagnosis Date   • Other specified disorder of intestines      constipation   • Urinary bladder disorder      low flow   • Pain      back   • Back pain    • Bruxism    • Sleep apnea        Social History   Substance Use Topics   • Smoking status: Never Smoker    • Smokeless tobacco: Never Used   • Alcohol Use: Yes      Comment: rare        Family History   Problem Relation Age of Onset   • Sleep Apnea Mother    • Hypertension Paternal Grandmother        Current medications as of today   Current Outpatient Prescriptions   Medication Sig Dispense Refill   • amoxicillin-clavulanate (AUGMENTIN) 875-125 MG Tab Take 1 Tab by mouth. FOR 7 DAYS  0   • SUMAtriptan Succinate (ONZETRA XSAIL) 11 MG/NOSEPC Exhaler Powder Spray 1 Dose in nose as needed. 6 Each 3   • naproxen (NAPROSYN) 500 MG Tab One tablet every 8 hours for severe migraine. 20 Tab 6   • promethazine  "(PHENERGAN) 25 MG Tab 25 mg phenergan once on day with severe migraine.  Will cause sleepiness.  Do not drive. 20 Tab 6   • cyclobenzaprine (FLEXERIL) 5 MG tablet Take 1-2 Tabs by mouth 3 times a day as needed for Muscle Spasms. 40 Tab 0   • hydrocodone-acetaminophen (NORCO) 7.5-325 MG per tablet Take 1-2 Tabs by mouth every four hours as needed ((Pain scale 4-6)). 90 Tab 0   • magnesium hydroxide (MILK OF MAGNESIA) 400 MG/5ML SUSP Take 30 mL by mouth 1 time daily as needed (If no BM ).     • senna-docusate (PERICOLACE OR SENOKOT S) 8.6-50 MG TABS Take 2 Tabs by mouth every day.     • sumatriptan (IMITREX) 50 MG TABS Take 1 Tab by mouth every 8 hours as needed for Migraine. 30 Each 0   • famotidine (PEPCID) 20 MG TABS Take 1 Tab by mouth 2 times a day. Take while on sterioids 10 Each 0   • therapeutic multivitamin-minerals (THERAGRAN-M) TABS Take 1 Tab by mouth every day.       No current facility-administered medications for this visit.       Allergies: Nkda    Blood pressure 118/88, pulse 63, resp. rate 16, height 1.829 m (6' 0.01\"), weight 104.327 kg (230 lb), SpO2 96 %.      ROS:   Constitutional: Denies fevers, chills, night sweats, weight loss or fatigue  Eyes: Denies pain, discharge/drainage  ENT: Denies tinnitus, hearing loss, sinusitis, hoarseness, epistaxis  Allergic: Denies Allergic rhinitis or hayfever  Respiratory: Denies cough, wheeze, dyspnea, hemoptysis  Cardiovascular: Denies chest pain, tightness, palpitations, orthopnea or edema  Sleep: See HPI  Psychiatric: Denies depression or anxiety    Physical exam:   Constitutional: Well-nourished, well-developed, in no acute distress  Eyes: PERRLA  Neck: supple, no masses  Respiratory: no intercostal retractions or accessory muscle use   Musculoskeletal: Normal gait, no clubbing or cyanosis  Skin: No rashes or lesions  Neuro: No focal deficit, cranial nerves grossly intact  Psychiatric: Oriented to time, person and place.     Diagnosis:  1. MILLA (obstructive " sleep apnea)-presumed  MASK FITTING    DME MASK AND SUPPLIES    DME OTHER   2. BMI 31.0-31.9,adult         Plan:    1. Continue CPAP nightly  2. Mask fitting today, RX for new mask to [facility]  3. Follow up with [facility] regarding travel battery and/or travel CPAP  4. Follow-up here in 6 weeks, sooner if needed

## 2017-08-11 NOTE — PATIENT INSTRUCTIONS
1. Continue CPAP nightly  2. Mask fitting today, RX for new mask to [facility]  3. Follow up with [facility] regarding travel battery and/or CPAP  4. Follow-up here in 6 weeks, sooner if needed

## 2017-08-11 NOTE — MR AVS SNAPSHOT
"        Buddy Dash   2017 11:40 AM   Sleep Center Visit   MRN: [id number]    Department:  [facility]   Dept Phone:  [phone]    Description:  Male : 1975   Provider:  TITO Miller           Allergies as of 2017     Allergen Noted Reactions    Nkda [No Known Drug Allergy] 2012         You were diagnosed with     MILLA (obstructive sleep apnea)   [773090]       BMI 31.0-31.9,adult   [788476]         Vital Signs     Blood Pressure Pulse Respirations Height Weight Body Mass Index    118/88 mmHg 63 16 1.829 m (6' 0.01\") 104.327 kg (230 lb) 31.19 kg/m2    Oxygen Saturation Smoking Status                96% Never Smoker           Basic Information     Date Of Birth Sex Race Ethnicity Preferred Language    1975 Male White Non- English      Your appointments     Sep 22, 2017  3:40 PM   Follow UP with TITO Miller   [facility] (--)    [address] [place] [id number]   [phone]              Problem List              ICD-10-CM Priority Class Noted - Resolved    Thoracic or lumbosacral neuritis or radiculitis, unspecified [id number]   2012 - Present    S/P spinal surgery Z98.890   2012 - Present    Radiculopathy M54.10   2012 - Present    Migraine with aura and without status migrainosus, not intractable G43.109   2017 - Present    MILLA (obstructive sleep apnea)-presumed G47.33   2017 - Present    BMI 31.0-31.9,adult Z68.31   2017 - Present      Health Maintenance        Date Due Completion Dates    IMM DTaP/Tdap/Td Vaccine (1 - Tdap) 1994 ---    IMM INFLUENZA (1) 2017 ---            Current Immunizations     No immunizations on file.      Below and/or attached are the medications your provider expects you to take. Review all of your home medications and newly ordered medications with your provider and/or pharmacist. Follow medication instructions as directed " by your provider and/or pharmacist. Please keep your medication list with you and share with your provider. Update the information when medications are discontinued, doses are changed, or new medications (including over-the-counter products) are added; and carry medication information at all times in the event of emergency situations     Allergies:  NKDA - (reactions not documented)               Medications  Valid as of: August 11, 2017 - 12:19 PM    Generic Name Brand Name Tablet Size Instructions for use    Amoxicillin-Pot Clavulanate (Tab) AUGMENTIN 875-125 MG Take 1 Tab by mouth. FOR 7 DAYS        Cyclobenzaprine HCl (Tab) FLEXERIL 5 MG Take 1-2 Tabs by mouth 3 times a day as needed for Muscle Spasms.        Famotidine (Tab) PEPCID 20 MG Take 1 Tab by mouth 2 times a day. Take while on sterioids        Hydrocodone-Acetaminophen (Tab) NORCO 7.5-325 MG Take 1-2 Tabs by mouth every four hours as needed ((Pain scale 4-6)).        Magnesium Hydroxide (Suspension) MILK OF MAGNESIA 400 MG/5ML Take 30 mL by mouth 1 time daily as needed (If no BM ).        Multiple Vitamins-Minerals (Tab) THERAGRAN-M  Take 1 Tab by mouth every day.        Naproxen (Tab) NAPROSYN 500 MG One tablet every 8 hours for severe migraine.        Promethazine HCl (Tab) PHENERGAN 25 MG 25 mg phenergan once on day with severe migraine.  Will cause sleepiness.  Do not drive.        Sennosides-Docusate Sodium (Tab) PERICOLACE or SENOKOT S 8.6-50 MG Take 2 Tabs by mouth every day.        SUMAtriptan Succinate (Tab) IMITREX 50 MG Take 1 Tab by mouth every 8 hours as needed for Migraine.        SUMAtriptan Succinate (Exhaler Powder) SUMAtriptan Succinate 11 MG/NOSEPC Spray 1 Dose in nose as needed.        .                 Medicines prescribed today were sent to:     [id number] [place], [place] - [id number] [facility] [address]    Phone: [phone] Fax: [phone]    Open 24 Hours?: No         Medication refill instructions:       If your prescription bottle indicates you have medication refills left, it is not necessary to call your provider’s office. Please contact your pharmacy and they will refill your medication.    If your prescription bottle indicates you do not have any refills left, you may request refills at any time through one of the following ways: The online Second Genome system (except Urgent Care), by calling your provider’s office, or by asking your pharmacy to contact your provider’s office with a refill request. Medication refills are processed only during regular business hours and may not be available until the next business day. Your provider may request additional information or to have a follow-up visit with you prior to refilling your medication.   *Please Note: Medication refills are assigned a new Rx number when refilled electronically. Your pharmacy may indicate that no refills were authorized even though a new prescription for the same medication is available at the pharmacy. Please request the medicine by name with the pharmacy before contacting your provider for a refill.        Your To Do List     Future Labs/Procedures Complete By Expires    MASK FITTING  As directed 8/11/2018      Instructions    1. Continue CPAP nightly  2. Mask fitting today  3. Follow up with [facility] regarding travel battery and/or CPAP  4. Follow-up here in 6 weeks, sooner if needed          Second Genome Access Code: [email or web address]  Expires: 8/27/2017  4:10 AM    Your email address is not on file at [facility].  Email Addresses are required for you to sign up for Second Genome, please contact [phone] to verify your personal information and to provide your email address prior to attempting to register for Second Genome.    Buddy Dash  [address] [place] [address]    Second Genome  A secure, online tool to manage your health information     [facility]’s Second Genome® is a secure, online tool that  connects you to your personalized health information from the privacy of your home -- day or night - making it very easy for you to manage your healthcare. Once the activation process is completed, you can even access your medical information using the Mysterio chiara, which is available for free in the Apple Chiara store or Google Play store.     To learn more about Mysterio, visit [email or web address]    There are two levels of access available (as shown below):   My Chart Features  [facility]  Urgent  Care Non-[facility] Doctor   Email your healthcare team securely and privately 24/7 X X X    Manage appointments: schedule your next appointment; view details of past/upcoming appointments X      Request prescription refills. X      View recent personal medical records, including lab and immunizations X X X X   View health record, including health history, allergies, medications X X X X   Read reports about your outpatient visits, procedures, consult and ER notes X X X X   See your discharge summary, which is a recap of your hospital and/or ER visit that includes your diagnosis, lab results, and care plan X X  X     How to register for Mysterio:  Once your e-mail address has been verified, follow the following steps to sign up for Mysterio.     1. Go to  [email or web address]  2. Click on the Sign Up Now box, which takes you to the New Member Sign Up page. You will need to provide the following information:  a. Enter your Mysterio Access Code exactly as it appears at the top of this page. (You will not need to use this code after you’ve completed the sign-up process. If you do not sign up before the expiration date, you must request a new code.)   b. Enter your date of birth.   c. Enter your home email address.   d. Click Submit, and follow the next screen’s instructions.  3. Create a Mysterio ID. This will be your Mysterio login ID and cannot be changed, so think of one  that is secure and easy to remember.  4. Create a Telekenex password. You can change your password at any time.  5. Enter your Password Reset Question and Answer. This can be used at a later time if you forget your password.   6. Enter your e-mail address. This allows you to receive e-mail notifications when new information is available in Telekenex.  7. Click Sign Up. You can now view your health information.    For assistance activating your Telekenex account, call [phone]

## 2017-09-22 ENCOUNTER — APPOINTMENT (OUTPATIENT)
Dept: SLEEP MEDICINE | Facility: MEDICAL CENTER | Age: 42
End: 2017-09-22
Payer: COMMERCIAL

## 2017-11-16 ENCOUNTER — SLEEP CENTER VISIT (OUTPATIENT)
Dept: SLEEP MEDICINE | Facility: MEDICAL CENTER | Age: 42
End: 2017-11-16
Payer: COMMERCIAL

## 2017-11-16 VITALS
DIASTOLIC BLOOD PRESSURE: 85 MMHG | HEIGHT: 72 IN | SYSTOLIC BLOOD PRESSURE: 120 MMHG | OXYGEN SATURATION: 95 % | WEIGHT: 210 LBS | BODY MASS INDEX: 28.44 KG/M2 | RESPIRATION RATE: 15 BRPM | HEART RATE: 65 BPM

## 2017-11-16 DIAGNOSIS — G47.33 OSA (OBSTRUCTIVE SLEEP APNEA): ICD-10-CM

## 2017-11-16 PROCEDURE — 99213 OFFICE O/P EST LOW 20 MIN: CPT | Performed by: NURSE PRACTITIONER

## 2017-11-16 NOTE — PROGRESS NOTES
No chief complaint on file.        HPI: This patient is a 42 y.o. male, who presents for 6 week follow-up MILLA. history includes bruxism, back pain, chronic migraine. Initially referred by Marsha VALENTINO for evaluation of MILLA in February of this year. Patient is a never smoker, no history of cardiac or pulmonary disease.     In regards to MILLA, PSG indicates sleep apnea with an AHI of 69.4, minimum oxygen saturation of 76 %. He was initiated on auto CPAP 5-12 cm H2O in April. Compliance download indicates 100% use over the past 30 days, average use of 4 hours 46 minutes per night, AHI of 0.3. Patient reports benefiting from therapy. He's been alternating masks between nasal pillows and dream wear. He feels the dream wear is more comfortable and needs a prescription. He has more energy during the day. Denies a.m. headache. No other changes to his health since he was last seen.      Past Medical History:   Diagnosis Date   • Back pain    • Bruxism    • Other specified disorder of intestines     constipation   • Pain     back   • Sleep apnea    • Urinary bladder disorder     low flow       Social History   Substance Use Topics   • Smoking status: Never Smoker   • Smokeless tobacco: Never Used   • Alcohol use Yes      Comment: rare        Family History   Problem Relation Age of Onset   • Sleep Apnea Mother    • Hypertension Paternal Grandmother        Current medications as of today   Current Outpatient Prescriptions   Medication Sig Dispense Refill   • SUMAtriptan Succinate (ONZETRA XSAIL) 11 MG/NOSEPC Exhaler Powder Spray 1 Dose in nose as needed. 6 Each 3   • naproxen (NAPROSYN) 500 MG Tab One tablet every 8 hours for severe migraine. 20 Tab 6   • promethazine (PHENERGAN) 25 MG Tab 25 mg phenergan once on day with severe migraine.  Will cause sleepiness.  Do not drive. 20 Tab 6   • sumatriptan (IMITREX) 50 MG TABS Take 1 Tab by mouth every 8 hours as needed for Migraine. 30 Each 0     No current facility-administered  medications for this visit.        Allergies: Nkda [no known drug allergy]    Blood pressure 120/85, pulse 65, resp. rate 15, height 1.829 m (6'), weight 95.3 kg (210 lb), SpO2 95 %.      ROS:   Constitutional: Denies fevers, chills, night sweats, weight loss or fatigue  Eyes: Denies pain, discharge/drainage  ENT: Denies tinnitus, hearing loss, sinusitis, hoarseness, epistaxis  Allergic: Denies Allergic rhinitis or hayfever  Respiratory: Denies cough, wheeze, dyspnea, hemoptysis  Cardiovascular: Denies chest pain, tightness, palpitations, orthopnea or edema  Sleep: See HPI  Neurological: Denies vertigo or headaches  Skin: Denies rashes, lesions  Psychiatric: Denies depression or anxiety    Physical exam:   Constitutional: Well-nourished, well-developed, in no acute distress  Eyes: PERRL  Neck: supple, no masses  Respiratory: no intercostal retractions or accessory muscle use   Musculoskeletal:  no clubbing or cyanosis  Skin: No rashes or lesions  Neuro: No focal deficit, cranial nerves grossly intact  Psychiatric: Oriented to time, person and place.     Diagnosis:  1. MILLA (obstructive sleep apnea)-presumed  DME MASK AND SUPPLIES   2. BMI 31.0-31.9,adult         Plan:  1. Continue CPAP nightly  2. Order for new mask  to [facility]  3. Clean mask & tubing weekly  4. Replace supplies as insurance will allow  5. Follow up annually, sooner if needed

## 2017-11-16 NOTE — PATIENT INSTRUCTIONS
1. Continue CPAP nightly  2. Order for new mask  to [facility]  3. Clean mask & tubing weekly  4. Replace supplies as insurance will allow  5. Follow up annually, sooner if needed

## 2018-06-07 DIAGNOSIS — G47.33 OSA (OBSTRUCTIVE SLEEP APNEA): ICD-10-CM

## 2018-06-07 DIAGNOSIS — G47.31 CENTRAL SLEEP APNEA: Primary | ICD-10-CM

## 2018-06-07 NOTE — LETTER
June 14, 2018        Buddy Dash is under our care for obstructive sleep apnea.  Sleep apnea is severe in nature.  It is medically necessary that he is treated with CPAP.  He travels frequently for work, and requires travel CPAP device to take with him when he travels.  Please contact us with further questions        My EAGLE, RADHA, FNP-BC

## 2018-06-07 NOTE — PROGRESS NOTES
Pt called this morning requesting an Rx for a travel CPAP because he travels a lot . Pt states that he speaks closely to [facility] and 90% of his insurance would pay for his travel machine.    Please sign

## 2018-06-08 NOTE — PROGRESS NOTES
Sent LOV, ORDER to DME:  Jena Neil ph [phone] / valarie [phone]  notified the pt thank you.

## 2018-06-13 NOTE — PROGRESS NOTES
His insurance company will only provide a travel pap only if we provide a letter to stating this travel pap is a necessity, pt states he travels a lot and is impossible to carry large pap everywhere he goes.    Is this possible? Please advise

## 2018-10-19 ENCOUNTER — SLEEP CENTER VISIT (OUTPATIENT)
Dept: SLEEP MEDICINE | Facility: MEDICAL CENTER | Age: 43
End: 2018-10-19
Payer: COMMERCIAL

## 2018-10-19 ENCOUNTER — HOME STUDY (OUTPATIENT)
Dept: SLEEP MEDICINE | Facility: MEDICAL CENTER | Age: 43
End: 2018-10-19
Attending: NURSE PRACTITIONER
Payer: COMMERCIAL

## 2018-10-19 VITALS
SYSTOLIC BLOOD PRESSURE: 130 MMHG | TEMPERATURE: 98.2 F | RESPIRATION RATE: 16 BRPM | BODY MASS INDEX: 31.15 KG/M2 | OXYGEN SATURATION: 97 % | WEIGHT: 230 LBS | HEART RATE: 63 BPM | DIASTOLIC BLOOD PRESSURE: 80 MMHG | HEIGHT: 72 IN

## 2018-10-19 DIAGNOSIS — G47.33 OSA (OBSTRUCTIVE SLEEP APNEA): ICD-10-CM

## 2018-10-19 DIAGNOSIS — Z23 NEED FOR VACCINATION: ICD-10-CM

## 2018-10-19 PROCEDURE — 94762 N-INVAS EAR/PLS OXIMTRY CONT: CPT | Performed by: FAMILY MEDICINE

## 2018-10-19 PROCEDURE — 99213 OFFICE O/P EST LOW 20 MIN: CPT | Performed by: NURSE PRACTITIONER

## 2018-10-19 ASSESSMENT — ENCOUNTER SYMPTOMS
TREMORS: 0
LOSS OF CONSCIOUSNESS: 0
TINGLING: 0
SENSORY CHANGE: 0
SPEECH CHANGE: 0
EYE PAIN: 0
CONSTITUTIONAL NEGATIVE: 1
FOCAL WEAKNESS: 0
MUSCULOSKELETAL NEGATIVE: 1
GASTROINTESTINAL NEGATIVE: 1
HEADACHES: 1
SEIZURES: 0
DIZZINESS: 0
EYE DISCHARGE: 0
PSYCHIATRIC NEGATIVE: 1
BRUISES/BLEEDS EASILY: 0
RESPIRATORY NEGATIVE: 1
CARDIOVASCULAR NEGATIVE: 1

## 2018-10-19 NOTE — PROGRESS NOTES
Chief Complaint   Patient presents with   • Apnea     last seen 11/16/17          HPI: This patient is a 43 y.o. male, who presents for annual follow-up of obstructive sleep apnea.     PSG indicates severe sleep apnea with an AHI of 69.4, minimum oxygen saturation of 76 %. he is compliant with auto CPAP 5-12 cm H2O. Compliance download over the past 30 days indicates 90 % compliance, average use of 6 hours 49 minutes per night, AHI of 0.5. Patient reports he recently received a new mask, dream wear nasal mask and it is much more comfortable.  Since then he has been sleeping better.  The first couple months of therapy he noticed significant improvement in how he felt.  Now things have leveled off.  He denies snoring or resuscitative gasps.  However his wife has noticed some snoring and gasping at night which is concerning.  He is unaware of this.  He denies a.m. headache.  He will occasionally get headaches throughout the day has a history of migraines.    Past Medical History:   Diagnosis Date   • Back pain    • Bruxism    • Other specified disorder of intestines     constipation   • Pain     back   • Sleep apnea    • Urinary bladder disorder     low flow       Social History   Substance Use Topics   • Smoking status: Never Smoker   • Smokeless tobacco: Never Used   • Alcohol use Yes      Comment: rare        Family History   Problem Relation Age of Onset   • Sleep Apnea Mother    • Hypertension Paternal Grandmother          There is no immunization history on file for this patient.    Current medications as of today   Current Outpatient Prescriptions   Medication Sig Dispense Refill   • SUMAtriptan Succinate (ONZETRA XSAIL) 11 MG/NOSEPC Exhaler Powder Spray 1 Dose in nose as needed. 6 Each 3   • naproxen (NAPROSYN) 500 MG Tab One tablet every 8 hours for severe migraine. 20 Tab 6   • promethazine (PHENERGAN) 25 MG Tab 25 mg phenergan once on day with severe migraine.  Will cause sleepiness.  Do not drive. 20 Tab 6    • sumatriptan (IMITREX) 50 MG TABS Take 1 Tab by mouth every 8 hours as needed for Migraine. 30 Each 0     No current facility-administered medications for this visit.        Allergies: Nkda [no known drug allergy]    Blood pressure 130/80, pulse 63, temperature 36.8 °C (98.2 °F), temperature source Temporal, resp. rate 16, height 1.829 m (6'), weight 104.3 kg (230 lb), SpO2 97 %.      Review of Systems   Constitutional: Negative.    HENT: Negative.    Eyes: Negative for pain and discharge.   Respiratory: Negative.    Cardiovascular: Negative.    Gastrointestinal: Negative.    Musculoskeletal: Negative.    Skin: Negative.    Neurological: Positive for headaches. Negative for dizziness, tingling, tremors, sensory change, speech change, focal weakness, seizures and loss of consciousness.   Endo/Heme/Allergies: Negative for environmental allergies. Does not bruise/bleed easily.   Psychiatric/Behavioral: Negative.        Physical Exam   Constitutional: He is oriented to person, place, and time and well-developed, well-nourished, and in no distress.   HENT:   Head: Normocephalic and atraumatic.   Eyes: Pupils are equal, round, and reactive to light.   Neck: Normal range of motion. Neck supple. No tracheal deviation present.   Pulmonary/Chest: Effort normal.   Musculoskeletal: Normal range of motion.   Neurological: He is alert and oriented to person, place, and time.   Skin: Skin is warm and dry.   Psychiatric: Mood, memory, affect and judgment normal.   Vitals reviewed.      Diagnoses/Plan:    1. MILLA (obstructive sleep apnea)  I reviewed the compliance download in detail with them.  I reassured them that compliance report shows excellent treatment of his sleep apnea with an AHI of 0.5.  He is using a nasal mask and may be breathing through his mouth which might be what his wife is hearing.  We discussed chinstrap or full facemask but he declines.  We discussed titration study to ensure adequate treatment of sleep  apnea given his persistent snoring and gasps per wife report.  I also offered overnight oximetry to ensure adequate nocturnal saturation.  If this is normal, titration study would likely be of little benefit.  There interested in overnight oximetry and will call for results.  I will send an order for new mask and supplies to [facility].  They will follow-up annually unless overnight oximetry is abnormal then he will require a titration study.    - Overnight Oximetry; Future  - DME Mask and Supplies    2. BMI 31.0-31.9,adult    3. Need for vaccination  He declines influenza vaccination           This dictation was created using voice recognition software. The accuracy of the dictation is limited to the abilities of the software. I expect there may be some errors of grammar and possibly content.

## 2018-10-23 ENCOUNTER — TELEPHONE (OUTPATIENT)
Dept: PULMONOLOGY | Facility: HOSPICE | Age: 43
End: 2018-10-23

## 2018-10-23 DIAGNOSIS — G47.33 OSA (OBSTRUCTIVE SLEEP APNEA): ICD-10-CM

## 2018-10-23 NOTE — TELEPHONE ENCOUNTER
----- Message from TITO Miller sent at 10/23/2018  8:57 AM PDT -----  Please let patient know OPO was borderline low. He needs increase in pressure to 14cm, then recheck OPO on higher pressure in about 3 weeks. I have placed orders

## 2018-10-23 NOTE — PROCEDURES
Over Night Pulse Oximetry     Indication:To assess the efficacy of the current pressure .       Impression:   The study was done on CPAP of 12 cm. The total recording time was 5 hrs 51 min. O2 Sat. jony was 84% and mean O2 sat was 87 % and baseline O2 at 90%. O2 sat was below 88% for 5.3 min of the flow evaluation time. Oxygen Desaturation (>=3%) Index was elevated at 5.8/hr.      Recommendation:  Consider increasing base pressure due to borderline CARLOS and low avg O2 saturation.

## 2018-11-01 NOTE — PROGRESS NOTES
Order faxed to [facility]. Pt notified and opo scheduled for 11/13/18 at sleep center

## 2018-11-13 ENCOUNTER — HOME STUDY (OUTPATIENT)
Dept: SLEEP MEDICINE | Facility: MEDICAL CENTER | Age: 43
End: 2018-11-13
Attending: NURSE PRACTITIONER
Payer: COMMERCIAL

## 2018-11-13 DIAGNOSIS — G47.33 OSA (OBSTRUCTIVE SLEEP APNEA): ICD-10-CM

## 2018-11-13 PROCEDURE — 94762 N-INVAS EAR/PLS OXIMTRY CONT: CPT | Performed by: FAMILY MEDICINE

## 2018-11-16 ENCOUNTER — TELEPHONE (OUTPATIENT)
Dept: SLEEP MEDICINE | Facility: MEDICAL CENTER | Age: 43
End: 2018-11-16

## 2018-11-16 NOTE — PROCEDURES
Over Night Pulse Oximetry     Indication:To assess the efficacy of the current pressure.       Impression:   The study was done on CPAP of 14 cm. The total recording time was 17 hrs 15 min. O2 Sat. jony was 85% and mean O2 sat was 88 % and baseline O2 at 91%. O2 sat was below 88% for 2 min of the flow evaluation time. Oxygen Desaturation (>=3%) Index was elevated at 2.6/hr.      Recommendation:  It seems like the machine was not recording for 9 out of the 17 hrs. Otherwise unremarkable. Continue CPAP at current pressures. Clinical correlation recommended.

## 2018-11-17 NOTE — TELEPHONE ENCOUNTER
----- Message from TITO Miller sent at 11/16/2018  4:25 PM PST -----  Can you call pt let him know CNOX normal.

## 2019-07-01 ENCOUNTER — APPOINTMENT (OUTPATIENT)
Dept: ADMISSIONS | Facility: MEDICAL CENTER | Age: 44
End: 2019-07-01
Attending: ORTHOPAEDIC SURGERY
Payer: COMMERCIAL

## 2019-07-01 RX ORDER — M-VIT,TX,IRON,MINS/CALC/FOLIC 27MG-0.4MG
1 TABLET ORAL DAILY
COMMUNITY

## 2019-07-01 NOTE — DISCHARGE PLANNING
DISCHARGE PLANNING NOTE      Procedure: Procedure(s):  ARTHROSCOPY, ANKLE  RECONSTRUCTION, LIGAMENT, ANKLE- LATERAL    Procedure Date: 7/8/2019  Insurance:  Payor: PINKY / Plan: ANTHEM [employer] /   Equipment currently available at home? None  Steps into the home? 2-3  Steps within the home? 0  Who will be with you during your recovery? spouse     Plan: Spoke with Buddy on the phone. He is not sure is he will be NWB. Dr. Mccoy has ordered an AFO ankle Stir-up. He may need crutches depending on his WG status. Anticipate discharge home.

## 2019-07-08 ENCOUNTER — HOSPITAL ENCOUNTER (OUTPATIENT)
Facility: MEDICAL CENTER | Age: 44
End: 2019-07-08
Attending: ORTHOPAEDIC SURGERY | Admitting: ORTHOPAEDIC SURGERY
Payer: COMMERCIAL

## 2019-07-08 ENCOUNTER — ANESTHESIA (OUTPATIENT)
Dept: SURGERY | Facility: MEDICAL CENTER | Age: 44
End: 2019-07-08
Payer: COMMERCIAL

## 2019-07-08 ENCOUNTER — ANESTHESIA EVENT (OUTPATIENT)
Dept: SURGERY | Facility: MEDICAL CENTER | Age: 44
End: 2019-07-08
Payer: COMMERCIAL

## 2019-07-08 VITALS
WEIGHT: 225.75 LBS | OXYGEN SATURATION: 99 % | HEIGHT: 72 IN | TEMPERATURE: 97.1 F | RESPIRATION RATE: 16 BRPM | SYSTOLIC BLOOD PRESSURE: 122 MMHG | HEART RATE: 61 BPM | BODY MASS INDEX: 30.58 KG/M2 | DIASTOLIC BLOOD PRESSURE: 79 MMHG

## 2019-07-08 PROCEDURE — 700102 HCHG RX REV CODE 250 W/ 637 OVERRIDE(OP): Performed by: ANESTHESIOLOGY

## 2019-07-08 PROCEDURE — A6454 SELF-ADHER BAND W>=3" <5"/YD: HCPCS | Performed by: ORTHOPAEDIC SURGERY

## 2019-07-08 PROCEDURE — 160046 HCHG PACU - 1ST 60 MINS PHASE II: Performed by: ORTHOPAEDIC SURGERY

## 2019-07-08 PROCEDURE — A6402 STERILE GAUZE <= 16 SQ IN: HCPCS | Performed by: ORTHOPAEDIC SURGERY

## 2019-07-08 PROCEDURE — A9270 NON-COVERED ITEM OR SERVICE: HCPCS | Performed by: ANESTHESIOLOGY

## 2019-07-08 PROCEDURE — 160025 RECOVERY II MINUTES (STATS): Performed by: ORTHOPAEDIC SURGERY

## 2019-07-08 PROCEDURE — 160041 HCHG SURGERY MINUTES - EA ADDL 1 MIN LEVEL 4: Performed by: ORTHOPAEDIC SURGERY

## 2019-07-08 PROCEDURE — E0114 CRUTCH UNDERARM PAIR NO WOOD: HCPCS | Performed by: ORTHOPAEDIC SURGERY

## 2019-07-08 PROCEDURE — 160036 HCHG PACU - EA ADDL 30 MINS PHASE I: Performed by: ORTHOPAEDIC SURGERY

## 2019-07-08 PROCEDURE — 500878 HCHG PACK, ARTHROSCOPY: Performed by: ORTHOPAEDIC SURGERY

## 2019-07-08 PROCEDURE — 700105 HCHG RX REV CODE 258: Performed by: ORTHOPAEDIC SURGERY

## 2019-07-08 PROCEDURE — 700101 HCHG RX REV CODE 250: Performed by: ANESTHESIOLOGY

## 2019-07-08 PROCEDURE — 160009 HCHG ANES TIME/MIN: Performed by: ORTHOPAEDIC SURGERY

## 2019-07-08 PROCEDURE — 160035 HCHG PACU - 1ST 60 MINS PHASE I: Performed by: ORTHOPAEDIC SURGERY

## 2019-07-08 PROCEDURE — C1713 ANCHOR/SCREW BN/BN,TIS/BN: HCPCS | Performed by: ORTHOPAEDIC SURGERY

## 2019-07-08 PROCEDURE — 500881 HCHG PACK, EXTREMITY: Performed by: ORTHOPAEDIC SURGERY

## 2019-07-08 PROCEDURE — 700111 HCHG RX REV CODE 636 W/ 250 OVERRIDE (IP)

## 2019-07-08 PROCEDURE — 700101 HCHG RX REV CODE 250: Performed by: ORTHOPAEDIC SURGERY

## 2019-07-08 PROCEDURE — 700111 HCHG RX REV CODE 636 W/ 250 OVERRIDE (IP): Performed by: ANESTHESIOLOGY

## 2019-07-08 PROCEDURE — A6223 GAUZE >16<=48 NO W/SAL W/O B: HCPCS | Performed by: ORTHOPAEDIC SURGERY

## 2019-07-08 PROCEDURE — 160029 HCHG SURGERY MINUTES - 1ST 30 MINS LEVEL 4: Performed by: ORTHOPAEDIC SURGERY

## 2019-07-08 PROCEDURE — 160022 HCHG BLOCK: Performed by: ORTHOPAEDIC SURGERY

## 2019-07-08 PROCEDURE — 501838 HCHG SUTURE GENERAL: Performed by: ORTHOPAEDIC SURGERY

## 2019-07-08 PROCEDURE — 160002 HCHG RECOVERY MINUTES (STAT): Performed by: ORTHOPAEDIC SURGERY

## 2019-07-08 PROCEDURE — 160048 HCHG OR STATISTICAL LEVEL 1-5: Performed by: ORTHOPAEDIC SURGERY

## 2019-07-08 DEVICE — SUTURE ANCHOR TWINFIX 3.5 WITH NEEDLES SMALL JOINT: Type: IMPLANTABLE DEVICE | Site: ANKLE | Status: FUNCTIONAL

## 2019-07-08 RX ORDER — ACETAMINOPHEN 500 MG
1000 TABLET ORAL ONCE
Status: COMPLETED | OUTPATIENT
Start: 2019-07-08 | End: 2019-07-08

## 2019-07-08 RX ORDER — SODIUM CHLORIDE, SODIUM LACTATE, POTASSIUM CHLORIDE, CALCIUM CHLORIDE 600; 310; 30; 20 MG/100ML; MG/100ML; MG/100ML; MG/100ML
INJECTION, SOLUTION INTRAVENOUS CONTINUOUS
Status: DISCONTINUED | OUTPATIENT
Start: 2019-07-08 | End: 2019-07-08 | Stop reason: HOSPADM

## 2019-07-08 RX ORDER — HALOPERIDOL 5 MG/ML
1 INJECTION INTRAMUSCULAR
Status: DISCONTINUED | OUTPATIENT
Start: 2019-07-08 | End: 2019-07-08 | Stop reason: HOSPADM

## 2019-07-08 RX ORDER — HYDROMORPHONE HYDROCHLORIDE 1 MG/ML
0.2 INJECTION, SOLUTION INTRAMUSCULAR; INTRAVENOUS; SUBCUTANEOUS
Status: DISCONTINUED | OUTPATIENT
Start: 2019-07-08 | End: 2019-07-08 | Stop reason: HOSPADM

## 2019-07-08 RX ORDER — MAGNESIUM HYDROXIDE 1200 MG/15ML
LIQUID ORAL
Status: COMPLETED | OUTPATIENT
Start: 2019-07-08 | End: 2019-07-08

## 2019-07-08 RX ORDER — ONDANSETRON 2 MG/ML
4 INJECTION INTRAMUSCULAR; INTRAVENOUS
Status: DISCONTINUED | OUTPATIENT
Start: 2019-07-08 | End: 2019-07-08 | Stop reason: HOSPADM

## 2019-07-08 RX ORDER — OXYCODONE HCL 5 MG/5 ML
10 SOLUTION, ORAL ORAL
Status: COMPLETED | OUTPATIENT
Start: 2019-07-08 | End: 2019-07-08

## 2019-07-08 RX ORDER — HYDRALAZINE HYDROCHLORIDE 20 MG/ML
5 INJECTION INTRAMUSCULAR; INTRAVENOUS
Status: DISCONTINUED | OUTPATIENT
Start: 2019-07-08 | End: 2019-07-08 | Stop reason: HOSPADM

## 2019-07-08 RX ORDER — ROPIVACAINE HYDROCHLORIDE 5 MG/ML
INJECTION, SOLUTION EPIDURAL; INFILTRATION; PERINEURAL PRN
Status: DISCONTINUED | OUTPATIENT
Start: 2019-07-08 | End: 2019-07-08 | Stop reason: SURG

## 2019-07-08 RX ORDER — MEPERIDINE HYDROCHLORIDE 25 MG/ML
12.5 INJECTION INTRAMUSCULAR; INTRAVENOUS; SUBCUTANEOUS
Status: DISCONTINUED | OUTPATIENT
Start: 2019-07-08 | End: 2019-07-08 | Stop reason: HOSPADM

## 2019-07-08 RX ORDER — LIDOCAINE HYDROCHLORIDE 10 MG/ML
INJECTION, SOLUTION EPIDURAL; INFILTRATION; INTRACAUDAL; PERINEURAL
Status: COMPLETED
Start: 2019-07-08 | End: 2019-07-08

## 2019-07-08 RX ORDER — ONDANSETRON 2 MG/ML
INJECTION INTRAMUSCULAR; INTRAVENOUS PRN
Status: DISCONTINUED | OUTPATIENT
Start: 2019-07-08 | End: 2019-07-08 | Stop reason: SURG

## 2019-07-08 RX ORDER — GABAPENTIN 300 MG/1
300 CAPSULE ORAL
Status: COMPLETED | OUTPATIENT
Start: 2019-07-08 | End: 2019-07-08

## 2019-07-08 RX ORDER — DEXAMETHASONE SODIUM PHOSPHATE 4 MG/ML
INJECTION, SOLUTION INTRA-ARTICULAR; INTRALESIONAL; INTRAMUSCULAR; INTRAVENOUS; SOFT TISSUE PRN
Status: DISCONTINUED | OUTPATIENT
Start: 2019-07-08 | End: 2019-07-08 | Stop reason: SURG

## 2019-07-08 RX ORDER — HYDROMORPHONE HYDROCHLORIDE 1 MG/ML
0.4 INJECTION, SOLUTION INTRAMUSCULAR; INTRAVENOUS; SUBCUTANEOUS
Status: DISCONTINUED | OUTPATIENT
Start: 2019-07-08 | End: 2019-07-08 | Stop reason: HOSPADM

## 2019-07-08 RX ORDER — DIPHENHYDRAMINE HYDROCHLORIDE 50 MG/ML
6.25 INJECTION INTRAMUSCULAR; INTRAVENOUS
Status: DISCONTINUED | OUTPATIENT
Start: 2019-07-08 | End: 2019-07-08 | Stop reason: HOSPADM

## 2019-07-08 RX ORDER — CEFAZOLIN SODIUM 1 G/3ML
INJECTION, POWDER, FOR SOLUTION INTRAMUSCULAR; INTRAVENOUS PRN
Status: DISCONTINUED | OUTPATIENT
Start: 2019-07-08 | End: 2019-07-08 | Stop reason: SURG

## 2019-07-08 RX ORDER — OXYCODONE HCL 5 MG/5 ML
5 SOLUTION, ORAL ORAL
Status: COMPLETED | OUTPATIENT
Start: 2019-07-08 | End: 2019-07-08

## 2019-07-08 RX ORDER — HYDROMORPHONE HYDROCHLORIDE 1 MG/ML
0.1 INJECTION, SOLUTION INTRAMUSCULAR; INTRAVENOUS; SUBCUTANEOUS
Status: DISCONTINUED | OUTPATIENT
Start: 2019-07-08 | End: 2019-07-08 | Stop reason: HOSPADM

## 2019-07-08 RX ORDER — CELECOXIB 200 MG/1
400 CAPSULE ORAL ONCE
Status: COMPLETED | OUTPATIENT
Start: 2019-07-08 | End: 2019-07-08

## 2019-07-08 RX ADMIN — ACETAMINOPHEN 1000 MG: 500 TABLET ORAL at 08:40

## 2019-07-08 RX ADMIN — SODIUM CHLORIDE, POTASSIUM CHLORIDE, SODIUM LACTATE AND CALCIUM CHLORIDE: 600; 310; 30; 20 INJECTION, SOLUTION INTRAVENOUS at 09:05

## 2019-07-08 RX ADMIN — ROPIVACAINE HYDROCHLORIDE 30 ML: 5 INJECTION, SOLUTION EPIDURAL; INFILTRATION; PERINEURAL at 09:10

## 2019-07-08 RX ADMIN — FENTANYL CITRATE 100 MCG: 50 INJECTION, SOLUTION INTRAMUSCULAR; INTRAVENOUS at 09:14

## 2019-07-08 RX ADMIN — DEXAMETHASONE SODIUM PHOSPHATE 8 MG: 4 INJECTION, SOLUTION INTRA-ARTICULAR; INTRALESIONAL; INTRAMUSCULAR; INTRAVENOUS; SOFT TISSUE at 09:16

## 2019-07-08 RX ADMIN — Medication 0.5 ML: at 08:48

## 2019-07-08 RX ADMIN — OXYCODONE HYDROCHLORIDE 5 MG: 5 SOLUTION ORAL at 11:30

## 2019-07-08 RX ADMIN — SODIUM CHLORIDE, POTASSIUM CHLORIDE, SODIUM LACTATE AND CALCIUM CHLORIDE: 600; 310; 30; 20 INJECTION, SOLUTION INTRAVENOUS at 09:55

## 2019-07-08 RX ADMIN — GABAPENTIN 300 MG: 300 CAPSULE ORAL at 08:40

## 2019-07-08 RX ADMIN — CEFAZOLIN 2 G: 330 INJECTION, POWDER, FOR SOLUTION INTRAMUSCULAR; INTRAVENOUS at 09:14

## 2019-07-08 RX ADMIN — ONDANSETRON 4 MG: 2 INJECTION INTRAMUSCULAR; INTRAVENOUS at 10:00

## 2019-07-08 RX ADMIN — CELECOXIB 400 MG: 200 CAPSULE ORAL at 08:40

## 2019-07-08 RX ADMIN — SODIUM CHLORIDE, POTASSIUM CHLORIDE, SODIUM LACTATE AND CALCIUM CHLORIDE: 600; 310; 30; 20 INJECTION, SOLUTION INTRAVENOUS at 08:48

## 2019-07-08 RX ADMIN — LIDOCAINE HYDROCHLORIDE 80 MG: 20 INJECTION, SOLUTION INTRAVENOUS at 09:14

## 2019-07-08 RX ADMIN — PROPOFOL 200 MG: 10 INJECTION, EMULSION INTRAVENOUS at 09:14

## 2019-07-08 RX ADMIN — LIDOCAINE HYDROCHLORIDE 0.5 ML: 10 INJECTION, SOLUTION EPIDURAL; INFILTRATION; INTRACAUDAL at 08:48

## 2019-07-08 RX ADMIN — MIDAZOLAM HYDROCHLORIDE 2 MG: 1 INJECTION, SOLUTION INTRAMUSCULAR; INTRAVENOUS at 09:07

## 2019-07-08 NOTE — ANESTHESIA PROCEDURE NOTES
Peripheral Block  Performed by: MAYA MITCHELL  Authorized by: MAYA MITCHELL     Patient Location:  OR  Start Time:  7/8/2019 9:08 AM  End Time:  7/8/2019 9:11 AM  Reason for Block: at surgeon's request and post-op pain management    patient identified, IV checked, site marked, risks and benefits discussed, surgical consent, monitors and equipment checked, pre-op evaluation and timeout performed    Patient Position:  Right lateral decubitus  Prep: ChloraPrep    Monitoring:  Heart rate, continuous pulse ox and cardiac monitor  Block Region:  Lower Extremity  Lower Extremity - Block Type:  SCIATIC nerve block, lateral approach and Saphenous nerve block - OTHER peripheral nerve or branch    Laterality:  Left  Procedures: ultrasound guided  Image captured, interpreted and electronically stored.  Local Infiltration:  Lidocaine  Strength:  1 %  Dose:  3 ml  Block Type:  Single-shot  Needle Length:  100mm  Needle Gauge:  21 G  Needle Localization:  Ultrasound guidance  Injection Assessment:  Negative aspiration for heme, no paresthesia on injection, incremental injection and local visualized surrounding nerve on ultrasound  Evidence of intravascular injection: No     US Guided Sciatic Nerve Block   US probe placed several cm proximal to popliteal crease on posterior thigh and scanned caudad and cephalad until Sciatic Nerve (SN) identified superficial/lateral to popliteal artery.  Needle inserted lateral to probe in an in plane approach under direct visualization to a perineural position.  After negative aspiration LA injected with ease and visualized surrounding the SN.    .

## 2019-07-08 NOTE — ANESTHESIA QCDR
2019 [facility] Clinical Data Registry (for Quality Improvement)     Postoperative nausea/vomiting risk protocol (Adult = 18 yrs and Pediatric 3-17 yrs)- (430 and 463)  General inhalation anesthetic (NOT TIVA) with PONV risk factors: Yes  Provision of anti-emetic therapy with at least 2 different classes of agents: Yes   Patient DID NOT receive anti-emetic therapy and reason is documented in Medical Record:  N/A    Multimodal Pain Management- (AQI59)  Patient undergoing Elective Surgery (i.e. Outpatient, or ASC, or Prescheduled Surgery prior to Hospital Admission): Yes  Use of Multimodal Pain Management, two or more drugs and/or interventions, NOT including systemic opioids: Yes   Exception: Documented allergy to multiple classes of analgesics:  N/A    PACU assessment of acute postoperative pain prior to Anesthesia Care End- Applies to Patients Age = 18- (ABG7)  Initial PACU pain score is which of the following: < 7/10  Patient unable to report pain score: N/A    Post-anesthetic transfer of care checklist/protocol to PACU/ICU- (426 and 427)  Upon conclusion of case, patient transferred to which of the following locations: PACU/Non-ICU  Use of transfer checklist/protocol: Yes  Exclusion: Service Performed in Patient Hospital Room (and thus did not require transfer): N/A    PACU Reintubation- (AQI31)  General anesthesia requiring endotracheal intubation (ETT) along with subsequent extubation in OR or PACU: No  Required reintubation in the PACU: N/A  Extubation was a planned trial documented in the medical record prior to removal of the original airway device: N/A    Unplanned admission to ICU related to anesthesia service up through end of PACU care- (MD51)  Unplanned admission to ICU (not initially anticipated at anesthesia start time): No

## 2019-07-08 NOTE — DISCHARGE INSTRUCTIONS
ACTIVITY: Rest and take it easy for the first 24 hours.  A responsible adult is recommended to remain with you during that time.  It is normal to feel sleepy.  We encourage you to not do anything that requires balance, judgment or coordination.    MILD FLU-LIKE SYMPTOMS ARE NORMAL. YOU MAY EXPERIENCE GENERALIZED MUSCLE ACHES, THROAT IRRITATION, HEADACHE AND/OR SOME NAUSEA.    FOR 24 HOURS DO NOT:  Drive, operate machinery or run household appliances.  Drink beer or alcoholic beverages.   Make important decisions or sign legal documents.    SPECIAL INSTRUCTIONS:   Weight Bearing As Tolerated in brace  Post op shoe  Crutches as needed  Elevate left leg  Keep splint clean dry, intact    DIET: To avoid nausea, slowly advance diet as tolerated, avoiding spicy or greasy foods for the first day.  Add more substantial food to your diet according to your physician's instructions.  Babies can be fed formula or breast milk as soon as they are hungry.  INCREASE FLUIDS AND FIBER TO AVOID CONSTIPATION.    SURGICAL DRESSING/BATHING: keep dressing clean, dry and intact until follow-up appointment; no submerging in water until follow-up appointment    FOLLOW-UP APPOINTMENT:  A follow-up appointment should be arranged with your doctor in 1-2 weeks; call to schedule.    You should CALL YOUR PHYSICIAN if you develop:  Fever greater than 101 degrees F.  Pain not relieved by medication, or persistent nausea or vomiting.  Excessive bleeding (blood soaking through dressing) or unexpected drainage from the wound.  Extreme redness or swelling around the incision site, drainage of pus or foul smelling drainage.  Inability to urinate or empty your bladder within 8 hours.  Problems with breathing or chest pain.    You should call 911 if you develop problems with breathing or chest pain.  If you are unable to contact your doctor or surgical center, you should go to the nearest emergency room or urgent care center.  Physician's telephone #:  [phone]    If any questions arise, call your doctor.  If your doctor is not available, please feel free to call the Surgical Center at [phone].  The Center is open Monday through Friday from 7AM to 7PM.  You can also call the HEALTH HOTLINE open 24 hours/day, 7 days/week and speak to a nurse at [phone], or toll free at [phone].    A registered nurse may call you a few days after your surgery to see how you are doing after your procedure.    MEDICATIONS: Resume taking daily medication.  Take prescribed pain medication with food.  If no medication is prescribed, you may take non-aspirin pain medication if needed.  PAIN MEDICATION CAN BE VERY CONSTIPATING.  Take a stool softener or laxative such as senokot, pericolace, or milk of magnesia if needed.    Prescriptions with wife.  Last pain medication given at 11:30 AM, next dose may be taken around 3:30 PM.    If your physician has prescribed pain medication that includes Acetaminophen (Tylenol), do not take additional Acetaminophen (Tylenol) while taking the prescribed medication.    Depression / Suicide Risk    As you are discharged from this [facility] facility, it is important to learn how to keep safe from harming yourself.    Recognize the warning signs:  · Abrupt changes in personality, positive or negative- including increase in energy   · Giving away possessions  · Change in eating patterns- significant weight changes-  positive or negative  · Change in sleeping patterns- unable to sleep or sleeping all the time   · Unwillingness or inability to communicate  · Depression  · Unusual sadness, discouragement and loneliness  · Talk of wanting to die  · Neglect of personal appearance   · Rebelliousness- reckless behavior  · Withdrawal from people/activities they love  · Confusion- inability to concentrate     If you or a loved one observes any of these behaviors or has concerns about self-harm, here's what you can do:  · Talk about it-  your feelings and reasons for harming yourself  · Remove any means that you might use to hurt yourself (examples: pills, rope, extension cords, firearm)  · Get professional help from the community (Mental Health, Substance Abuse, psychological counseling)  · Do not be alone:Call your Safe Contact- someone whom you trust who will be there for you.  · Call your local CRISIS HOTLINE 182-6406 or 517-823-2037  · Call your local Children's Mobile Crisis Response Team [place] (632) 907-4609 or www.[employer]Pack  · Call the toll free National Suicide Prevention Hotlines   · National Suicide Prevention Lifeline 498-722-FIRE (6944)  · National Hope Line Network 800-SUICIDE (431-3374)

## 2019-07-08 NOTE — OR NURSING
Pt's VSS; denies N/V; states pain is at tolerable level. Dressing CDI to LLE. D/c orders received. IV dc'd. Pt changed into clothing with assistance. Pt up and ambulated to BR, steady gait, voided adequately. Discharge instructions given; pt and family verbalized understanding and questions answered. Patient states ready to d/c home. Prescriptions given and with wife. Pt dc'd in w/c with CNA in stable condition.

## 2019-07-08 NOTE — PROGRESS NOTES
Post op shoe was delivered and fitted to patient.  If any further assistance needed, please call extension [phone] or place order for Ortho Technician assistance as a communication order in "[employer].

## 2019-07-08 NOTE — ANESTHESIA PREPROCEDURE EVALUATION
Past Medical History:   Diagnosis Date   • Back pain    • Bruxism    • Pain    • Sleep apnea     uses CPAP   • Urinary bladder disorder     low flow       Relevant Problems   (+) MILLA (obstructive sleep apnea)       Physical Exam    Airway   Mallampati: II  TM distance: >3 FB  Neck ROM: full       Cardiovascular - normal exam  Rhythm: regular  Rate: normal  (-) murmur     Dental - normal exam         Pulmonary - normal exam  Breath sounds clear to auscultation     Abdominal    Neurological - normal exam                 Anesthesia Plan    ASA 2       Plan - general and peripheral nerve block     Peripheral nerve block will be post-op pain control  Airway plan will be LMA        Induction: intravenous    Postoperative Plan: Postoperative administration of opioids is intended.    Pertinent diagnostic labs and testing reviewed    Informed Consent:    Anesthetic plan and risks discussed with patient.

## 2019-07-08 NOTE — OP REPORT
DATE OF SERVICE:  07/08/2019    PREOPERATIVE DIAGNOSES:  1.  Left ankle arthrofibrosis.  2.  Left medial osteochondral defect of the medial talar dome.  3.  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    POSTOPERATIVE DIAGNOSES:  1.  Left ankle arthrofibrosis.  2.  Left medial osteochondral defect of the medial talar dome.  3.  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    PROCEDURE PERFORMED:  1.  Left ankle arthroscopic extensive debridement.  2.  Left ankle curettage of bone marrow stimulation, medial osteochondral   defect of the talus.  3.  Left modified Brostrom reconstruction of the ATFL and CFL ligaments.    SURGEON:  David Mccoy MD    FIRST ASSISTANT:  Lenin Norris MD    SECOND ASSISTANT:  Elsy Garcia.    ANESTHESIA:  General endotracheal with popliteal block per my request for   postoperative pain management.    ESTIMATED BLOOD LOSS:  None.    COMPLICATIONS:  None.    POSTOPERATIVE PLAN:  1.  Weightbearing as tolerated in a stirrup brace.  2.  Preoperative antibiotics.  3.  Follow up in 2 weeks.    INTRAOPERATIVE FINDINGS:  Medial osteochondral defect of the talus measuring 4   mm in diameter.    INDICATIONS:  The patient is a pleasant 44-year-old male who has had problems   with his left ankle for some time.  The above diagnoses made and options were   discussed with him including operative and nonoperative.  He elected to   undergo operative intervention.  The above procedure was discussed and all   questions were answered.  Risks of surgery were explained, which include but   not limited to wound problems, infection, nerve injury, vascular injury, need   for further surgery.  He understands he could have persistent risk of his   pain, need for further surgery.  He understands and accepts these risks and   agrees to proceed.  His site was marked by myself prior to receiving   psychotropic medicines.    PROCEDURE IN DETAIL:  The patient  was given a popliteal block per my request   for postoperative pain management.  He was brought to the operating room and   underwent general endotracheal anesthetic without complications.  The patient   was prepped and draped in sterile fashion in the supine position over the well   leg howard.  Positive site verification confirmed as the left lower extremity   for the above procedure and confirmation that he received preoperative   antibiotics.  Esmarch was used to exsanguinate his foot, ankle, and leg and   tourniquet was inflated to 250 mmHg.  An 18-gauge needle was placed medial to   the preoperatively marked tibialis anterior at the level of joint line.  Ankle   was insufflated with 10 mL of normal saline.  A 15 blade was used to make   full thickness arthrotomy and 4.0 scope was introduced.  An 18-gauge needle   was then placed lateral to the preoperatively marked peroneus tertius below   the joint line marked in the lateral incision.  Skin incision was made and   blunt dissection was down into the joint.  He had extensive arthrofibrosis and   synovitis around the anterolateral part of his ankle with a hypertrophic   Rimforest's ligament.  This was all debrided.  He also had some hypertrophic   tissue in his syndesmosis, which was also debrided.  Probe palpation of the   lateral talar dome demonstrated some softening of the cartilage; however, no   obvious splits or tears and no full thickness cracks.  All instrumentation   were removed.  The scope was placed in the lateral portal.  Shaver was   introduced into the medial side, completing the extensive debridement   throughout the anterior and anteromedial aspect of his ankle.  Probe palpation   over the medial talar dome demonstrated some cartilage that was apprised and   he had a full thickness crack.  This was curetted out to stable margins down   to the bone for bone marrow stimulation.  It measured 4 mm in diameter.  All   instrumentation was removed.   Scope portals were closed with interrupted nylon   suture.  Sterile Tegaderm was placed.  Next, he was repositioned, prepped and   draped in the lateral position, left side up.  A longitudinal incision was   made over his lateral malleolus.  It was brought down to the peroneal tendon   sheath, which was opened up.  Both peroneal tendons were evaluated and were   pristine.  Next, the CFL ligament was identified and released off its superior   and inferior margins directed off of the fibula.  The ATFL was identified and   released off the superior and inferior margins directed off of the fibula.    Fibular periosteum was elevated up and a rongeur was used to rongeur down to   good subchondral bone.  A 3.5 corkscrew anchor was placed at the junction of   the ATFL and CFL ligament.  One pair of suture was brought to the CFL   ligament, the other was brought to the ATFL ligament.  The foot was then held   in plantar flexion, eversion and CFL ligament was pretensioned and tied.  The   foot was brought into dorsiflexion, eversion and the ATFL ligament was   pretensioned and tied.  Extensor retinaculum was repaired back to the fibular   periosteum with multiple 0 Vicryls in a mattress type fashion.  He had   excellent stability.  Wounds were then irrigated with copious irrigation and   closed in layered fashion using 3-0 Vicryl and 3-0 nylon.  Sterile dressings   were applied.  He was transferred to the recovery room in good condition.    Utilization of Dr. Norris was necessary for patient positioning, holding,   retracting, wound closure, and dressing and splint placement.  He was present   throughout the entire procedure.       ____________________________________     MD NIKO HURST / NTS    DD:  07/08/2019 10:34:51  DT:  07/08/2019 11:50:40    D#:  [id number]  Job#:  [id number]    cc: [facility]

## 2019-07-08 NOTE — ANESTHESIA PROCEDURE NOTES
Airway  Date/Time: 7/8/2019 9:15 AM  Performed by: MAYA MITCHELL  Authorized by: MAYA MITCHELL     Location:  OR  Urgency:  Elective  Difficult Airway: No    Indications for Airway Management:  Anesthesia  Spontaneous Ventilation: absent    Sedation Level:  Deep  Preoxygenated: Yes    Mask Difficulty Assessment:  1 - vent by mask  Final Airway Type:  Supraglottic airway  Final Supraglottic Airway:  Standard LMA  SGA Size:  4  Number of Attempts at Approach:  1

## 2019-07-08 NOTE — ANESTHESIA POSTPROCEDURE EVALUATION
Patient: Buddy Dash    Procedure Summary     Date:  07/08/19 Room / Location:  [place] [address] / SURGERY [facility]    Anesthesia Start:  0905 Anesthesia Stop:  1020    Procedures:       ARTHROSCOPY, ANKLE (Left Ankle)      RECONSTRUCTION, LIGAMENT, ANKLE- LATERAL   (Left Ankle) Diagnosis:  (INSTABILITY LEFT ANKLE )    Surgeon:  David Mccoy M.D. Responsible Provider:  Drake Washington M.D.    Anesthesia Type:  general, peripheral nerve block ASA Status:  2          Final Anesthesia Type: general, peripheral nerve block  Last vitals  BP   Blood Pressure: 121/70, NIBP: 158/96    Temp   36.2 °C (97.1 °F)    Pulse   Pulse: 77, Heart Rate (Monitored): 75   Resp   14    SpO2   99 %      Anesthesia Post Evaluation    Patient location during evaluation: PACU  Patient participation: complete - patient participated  Level of consciousness: sleepy but conscious    Airway patency: patent  Anesthetic complications: no  Cardiovascular status: hemodynamically stable  Respiratory status: acceptable  Hydration status: euvolemic    PONV: none           Nurse Pain Score: 0 (NPRS)

## 2019-10-18 ENCOUNTER — SLEEP CENTER VISIT (OUTPATIENT)
Dept: SLEEP MEDICINE | Facility: MEDICAL CENTER | Age: 44
End: 2019-10-18
Payer: COMMERCIAL

## 2019-10-18 VITALS
DIASTOLIC BLOOD PRESSURE: 60 MMHG | SYSTOLIC BLOOD PRESSURE: 126 MMHG | BODY MASS INDEX: 30.8 KG/M2 | HEIGHT: 71 IN | OXYGEN SATURATION: 93 % | RESPIRATION RATE: 16 BRPM | HEART RATE: 62 BPM | WEIGHT: 220 LBS

## 2019-10-18 DIAGNOSIS — G47.33 OSA (OBSTRUCTIVE SLEEP APNEA): ICD-10-CM

## 2019-10-18 DIAGNOSIS — Z98.890 S/P SURGICAL MANIPULATION OF ANKLE JOINT: ICD-10-CM

## 2019-10-18 DIAGNOSIS — I10 ESSENTIAL HYPERTENSION: ICD-10-CM

## 2019-10-18 PROCEDURE — 99214 OFFICE O/P EST MOD 30 MIN: CPT | Performed by: NURSE PRACTITIONER

## 2019-10-18 RX ORDER — LOSARTAN POTASSIUM 25 MG/1
25 TABLET ORAL
Refills: 0 | COMMUNITY
Start: 2019-10-03 | End: 2021-10-22

## 2019-10-18 RX ORDER — LOSARTAN POTASSIUM 25 MG/1
25 TABLET ORAL
COMMUNITY
Start: 2019-09-27 | End: 2019-10-18

## 2019-10-18 ASSESSMENT — ENCOUNTER SYMPTOMS
HEADACHES: 1
SPEECH CHANGE: 0
EYE DISCHARGE: 0
TINGLING: 0
TREMORS: 0
BRUISES/BLEEDS EASILY: 0
EYE PAIN: 0
CONSTITUTIONAL NEGATIVE: 1
WEAKNESS: 0
GASTROINTESTINAL NEGATIVE: 1
FOCAL WEAKNESS: 0
CARDIOVASCULAR NEGATIVE: 1
SEIZURES: 0
PSYCHIATRIC NEGATIVE: 1
RESPIRATORY NEGATIVE: 1
MUSCULOSKELETAL NEGATIVE: 1
DIZZINESS: 0
LOSS OF CONSCIOUSNESS: 0
SENSORY CHANGE: 0

## 2019-10-18 NOTE — PROGRESS NOTES
Chief Complaint   Patient presents with   • Apnea     Last Seen 10/19/18         HPI: This patient is a 44 y.o. male, who presents for annual follow-up of obstructive sleep apnea.     PSG indicates severe sleep apnea with an AHI of 69.4, minimum oxygen saturation of 76 %. he is compliant with auto CPAP 5-12 cm H2O.  Compliance download last year showed normal AHI of 0.5 and excellent use.  His wife however was concerned about snoring.  His pressures were increased 5 to 14 cm H2 oh.  Repeat overnight oximetry shows loss of data for part of the night but the rest of the night basal SPO2 of 91%.  He did not bring his compliance chip today.  He continues to use his machine night approximately 6 to 8 hours.  He reports no change in symptoms.  He has a rare a.m. headache.  Mask and pressures are comfortable.  He has questions regarding the so clean machine and maintenance.  ESS is 8.  He had an ankle surgery in July to help stabilize left ankle.  He is healed well and just completed physical therapy.  His PCP has placed him on losartan 25 mg daily for hypertension.  He denies symptoms with this.      Past Medical History:   Diagnosis Date   • Back pain    • Bruxism    • Pain    • Sleep apnea     uses CPAP   • Urinary bladder disorder     low flow       Social History     Tobacco Use   • Smoking status: Never Smoker   • Smokeless tobacco: Never Used   Substance Use Topics   • Alcohol use: Yes     Comment: 2 drinks per month    • Drug use: No       Family History   Problem Relation Age of Onset   • Sleep Apnea Mother    • Hypertension Paternal Grandmother        Immunization History   Administered Date(s) Administered   • Tdap Vaccine 09/11/2019       Current medications as of today   Current Outpatient Medications   Medication Sig Dispense Refill   • losartan (COZAAR) 25 MG Tab Take 25 mg by mouth.  0   • therapeutic multivitamin-minerals (THERAGRAN-M) Tab Take 1 Tab by mouth every day.       No current  "facility-administered medications for this visit.        Allergies: Nkda [no known drug allergy]    /60 (BP Location: Left arm, Patient Position: Sitting, BP Cuff Size: Adult)   Pulse 62   Resp 16   Ht 1.811 m (5' 11.3\")   Wt 99.8 kg (220 lb)   SpO2 93%       Review of Systems   Constitutional: Negative.    HENT: Negative.    Eyes: Negative for pain and discharge.   Respiratory: Negative.    Cardiovascular: Negative.    Gastrointestinal: Negative.    Musculoskeletal: Negative.    Skin: Negative.    Neurological: Positive for headaches. Negative for dizziness, tingling, tremors, sensory change, speech change, focal weakness, seizures, loss of consciousness and weakness.   Endo/Heme/Allergies: Negative for environmental allergies. Does not bruise/bleed easily.   Psychiatric/Behavioral: Negative.        Physical Exam   Constitutional: He is oriented to person, place, and time and well-developed, well-nourished, and in no distress.   HENT:   Head: Normocephalic and atraumatic.   Eyes: Pupils are equal, round, and reactive to light.   Neck: Normal range of motion. Neck supple. No tracheal deviation present.   Cardiovascular: Normal rate, regular rhythm and normal heart sounds.   Pulmonary/Chest: Effort normal and breath sounds normal. No respiratory distress. He has no wheezes. He has no rales.   Musculoskeletal: Normal range of motion.   Neurological: He is alert and oriented to person, place, and time. Gait normal.   Skin: Skin is warm and dry.   Psychiatric: Mood, memory, affect and judgment normal.   Vitals reviewed.      Diagnoses/Plan:    1. MILLA (obstructive sleep apnea)   Continue auto CPAP nightly, Clean mask & tubing weekly, Replace supplies as insurance will allow, RX for new supplies to DME.  Questions answered regarding so clean device and maintenance/cleaning schedule.  - DME Mask and Supplies    2. Essential hypertension  Stable, managed by PCP and compliant with Cozaar.  Denies symptoms.    3. " S/P surgical manipulation of ankle joint  Has completed physical therapy, reports no difficulty with joint.    Follow-up annually at the sleep clinic, sooner if needed      This dictation was created using voice recognition software. The accuracy of the dictation is limited to the abilities of the software. I expect there may be some errors of grammar and possibly content.

## 2020-10-22 PROBLEM — Z78.9 NON-SMOKER: Status: ACTIVE | Noted: 2020-10-22

## 2020-10-22 NOTE — PROGRESS NOTES
CC: MILLA on AutoPap 5-14 cm water    HPI:  Buddy Dash is a 45 y.o. [address]  kindly referred by Yordan Monahan M.D. and last seen 10/18/2019 for an AHI 69.4/jony saturation 76% on AutoPap 5-14 cm water.  He did not bring his chip to his last appointment but reported using his machine 6-8 hours a night with no change in symptoms.    Today, 10/23/2020, his 30-day compliance is 7 hours and 37 minutes.  His average residual apnea-hypopnea index is 0.4 and he has no significant leak.  Wife has noted jerking.    The patient reports improved symptoms using positive airway pressure.  Has experienced no significant problems with mask fit, mask leak, pressure tolerance, excessive airway dryness, nasal congestion, aerophagia, or chest pain.    Significant comorbidities and modifying factors include essential hypertension, ankle surgery, increased BMI, migraines, spinal surgery, and never smoker.    Patient Active Problem List    Diagnosis Date Noted   • Non-smoker 10/22/2020   • Essential hypertension 10/18/2019   • S/P surgical manipulation of ankle joint 10/18/2019   • MILLA (obstructive sleep apnea) 02/24/2017   • BMI 31.0-31.9,adult 02/24/2017   • Migraine with aura and without status migrainosus, not intractable 01/31/2017   • Radiculopathy 08/30/2012   • Thoracic or lumbosacral neuritis or radiculitis, unspecified 08/22/2012   • S/P spinal surgery 08/22/2012       Past Medical History:   Diagnosis Date   • Back pain    • Bruxism    • Pain    • Sleep apnea     uses CPAP   • Urinary bladder disorder     low flow        Past Surgical History:   Procedure Laterality Date   • ANKLE ARTHROSCOPY Left 7/8/2019    Procedure: ARTHROSCOPY, ANKLE;  Surgeon: David Mccoy M.D.;  Location: SURGERY [facility];  Service: Orthopedics   • ANKLE LIGAMENT RECONSTRUCTION Left 7/8/2019    Procedure: RECONSTRUCTION, LIGAMENT, ANKLE- LATERAL  ;  Surgeon: David Mccoy M.D.;  Location: [facility]  [facility];  Service: Orthopedics   • LUMBAR LAMINECTOMY DISKECTOMY  8/31/2012    Performed by WILLI ALVAREZ at SURGERY [facility]   • LUMBAR LAMINECTOMY DISKECTOMY  8/22/2012    Performed by WILLI ALVAREZ at SURGERY [facility]   • ACL RECONSTRUCTION Right 1995   • ARTHROSCOPY, KNEE     • LAMINOTOMY     • LUMBAR LAMINECTOMY DISKECTOMY      left       Family History   Problem Relation Age of Onset   • Sleep Apnea Mother    • Hypertension Paternal Grandmother        Social History     Socioeconomic History   • Marital status:      Spouse name: Not on file   • Number of children: Not on file   • Years of education: Not on file   • Highest education level: Not on file   Occupational History   • Not on file   Social Needs   • Financial resource strain: Not on file   • Food insecurity     Worry: Not on file     Inability: Not on file   • Transportation needs     Medical: Not on file     Non-medical: Not on file   Tobacco Use   • Smoking status: Never Smoker   • Smokeless tobacco: Never Used   Substance and Sexual Activity   • Alcohol use: Yes     Comment: 2 drinks per month    • Drug use: No   • Sexual activity: Not on file   Lifestyle   • Physical activity     Days per week: Not on file     Minutes per session: Not on file   • Stress: Not on file   Relationships   • Social connections     Talks on phone: Not on file     Gets together: Not on file     Attends Buddhist service: Not on file     Active member of club or organization: Not on file     Attends meetings of clubs or organizations: Not on file     Relationship status: Not on file   • Intimate partner violence     Fear of current or ex partner: Not on file     Emotionally abused: Not on file     Physically abused: Not on file     Forced sexual activity: Not on file   Other Topics Concern   • Not on file   Social History Narrative   • Not on file       Current Outpatient Medications   Medication Sig Dispense Refill   • losartan (COZAAR) 25 MG  "Tab Take 25 mg by mouth.  0   • therapeutic multivitamin-minerals (THERAGRAN-M) Tab Take 1 Tab by mouth every day.       No current facility-administered medications for this visit.     \"CURRENT RX\"    ALLERGIES: Nkda [no known drug allergy]    ROS    Constitutional: Denies fever, chills, sweats,  weight loss, fatigue  Cardiovascular: Denies chest pain, tightness, palpitations, swelling in legs/feet, fainting, difficulty breathing when lying down but gets better when sitting up.   Respiratory: Denies shortness of breath, cough, sputum, wheezing, painful breathing, coughing up blood.   Sleep: per HPI  Gastrointestinal: Denies  difficulty swallowing, nausea, abdominal pain, diarrhea, constipation, heartburn.  Musculoskeletal: Denies painful joints, sore muscles, back pain.        PHYSICAL EXAM  Muscular    /88 (BP Location: Right arm, Patient Position: Sitting, BP Cuff Size: Adult)   Pulse (!) 58   Resp 16   Ht 1.816 m (5' 11.5\")   Wt 103.9 kg (229 lb)   SpO2 94%   BMI 31.49 kg/m²   Appearance: Well-nourished, well-developed, no acute distress  Eyes: Mask  Neck: Supple, trachea midline, no masses  Respiratory effort:  No intercostal retractions or use of accessory muscles  Lung auscultation:  No wheezes rhonchi rubs or rales  Cardiac: No murmurs, rubs, or gallops; regular rhythm, normal rate; no edema  Abdomen:  No tenderness, no organomegaly.   Musculoskeletal:  Grossly normal; gait and station normal; digits and nails normal  Skin:  No rashes, petechiae, cyanosis  Neurologic: without focal signs; oriented to person, time, place, and purpose; judgement intact  Psychiatric:  No depression, anxiety, agitation        PROBLEMS:    1. MILLA (obstructive sleep apnea)  - DME Mask and Supplies    2. Non-smoker    3. Essential hypertension    4. BMI 31.0-31.9,adult    5. Migraine with aura and without status migrainosus, not intractable      PLAN:     Has been advised to continue the current PAP, clean equipment " frequently, and get new mask and supplies as allowed by insurance and DME. Advised about potential problems including nasal obstruction/stuffiness, mask leak or discomfort , frequent awakenings, chill or dryness of the upper airway, noise, inconvenience, and lack of benefit. Recommend an earlier appointment, if significant treatment barriers develop.    Have advised the patient to follow up with the appropriate healthcare practitioners for all other medical problems and issues.    Return in about 1 year (around 10/23/2021).

## 2020-10-23 ENCOUNTER — SLEEP CENTER VISIT (OUTPATIENT)
Dept: SLEEP MEDICINE | Facility: MEDICAL CENTER | Age: 45
End: 2020-10-23
Payer: COMMERCIAL

## 2020-10-23 VITALS
DIASTOLIC BLOOD PRESSURE: 88 MMHG | HEART RATE: 58 BPM | HEIGHT: 72 IN | WEIGHT: 229 LBS | RESPIRATION RATE: 16 BRPM | OXYGEN SATURATION: 94 % | BODY MASS INDEX: 31.02 KG/M2 | SYSTOLIC BLOOD PRESSURE: 128 MMHG

## 2020-10-23 DIAGNOSIS — Z78.9 NON-SMOKER: ICD-10-CM

## 2020-10-23 DIAGNOSIS — G43.109 MIGRAINE WITH AURA AND WITHOUT STATUS MIGRAINOSUS, NOT INTRACTABLE: ICD-10-CM

## 2020-10-23 DIAGNOSIS — G47.33 OSA (OBSTRUCTIVE SLEEP APNEA): ICD-10-CM

## 2020-10-23 DIAGNOSIS — I10 ESSENTIAL HYPERTENSION: ICD-10-CM

## 2020-10-23 PROCEDURE — 99214 OFFICE O/P EST MOD 30 MIN: CPT | Performed by: INTERNAL MEDICINE

## 2021-02-25 ENCOUNTER — APPOINTMENT (RX ONLY)
Dept: URBAN - METROPOLITAN AREA CLINIC 38 | Facility: CLINIC | Age: 46
Setting detail: DERMATOLOGY
End: 2021-02-25

## 2021-02-25 DIAGNOSIS — D22 MELANOCYTIC NEVI: ICD-10-CM

## 2021-02-25 DIAGNOSIS — L81.4 OTHER MELANIN HYPERPIGMENTATION: ICD-10-CM

## 2021-02-25 DIAGNOSIS — H61.03 CHONDRITIS OF EXTERNAL EAR: ICD-10-CM

## 2021-02-25 PROBLEM — H61.032 CHONDRITIS OF LEFT EXTERNAL EAR: Status: ACTIVE | Noted: 2021-02-25

## 2021-02-25 PROBLEM — D22.5 MELANOCYTIC NEVI OF TRUNK: Status: ACTIVE | Noted: 2021-02-25

## 2021-02-25 PROCEDURE — 99203 OFFICE O/P NEW LOW 30 MIN: CPT

## 2021-02-25 PROCEDURE — ? COUNSELING

## 2021-02-25 PROCEDURE — ? PRESCRIPTION

## 2021-02-25 RX ORDER — CLOBETASOL PROPIONATE 0.5 MG/G
OINTMENT TOPICAL BID
Qty: 1 | Refills: 2 | Status: ERX | COMMUNITY
Start: 2021-02-25

## 2021-02-25 RX ADMIN — CLOBETASOL PROPIONATE: 0.5 OINTMENT TOPICAL at 00:00

## 2021-02-25 ASSESSMENT — LOCATION ZONE DERM
LOCATION ZONE: TRUNK
LOCATION ZONE: EAR
LOCATION ZONE: FACE
LOCATION ZONE: EAR

## 2021-02-25 ASSESSMENT — LOCATION SIMPLE DESCRIPTION DERM
LOCATION SIMPLE: CHEST
LOCATION SIMPLE: LEFT EAR
LOCATION SIMPLE: LEFT EAR
LOCATION SIMPLE: LEFT CHEEK
LOCATION SIMPLE: LEFT UPPER BACK

## 2021-02-25 ASSESSMENT — LOCATION DETAILED DESCRIPTION DERM
LOCATION DETAILED: LEFT MEDIAL UPPER BACK
LOCATION DETAILED: LEFT SUPERIOR HELIX
LOCATION DETAILED: LEFT MEDIAL SUPERIOR CHEST
LOCATION DETAILED: LEFT SUPERIOR HELIX
LOCATION DETAILED: LEFT INFERIOR CENTRAL MALAR CHEEK

## 2021-10-22 ENCOUNTER — OFFICE VISIT (OUTPATIENT)
Dept: SLEEP MEDICINE | Facility: MEDICAL CENTER | Age: 46
End: 2021-10-22
Payer: COMMERCIAL

## 2021-10-22 VITALS
HEIGHT: 72 IN | HEART RATE: 61 BPM | SYSTOLIC BLOOD PRESSURE: 136 MMHG | DIASTOLIC BLOOD PRESSURE: 80 MMHG | WEIGHT: 242.5 LBS | BODY MASS INDEX: 32.85 KG/M2 | OXYGEN SATURATION: 96 % | RESPIRATION RATE: 16 BRPM

## 2021-10-22 DIAGNOSIS — G47.33 OSA (OBSTRUCTIVE SLEEP APNEA): ICD-10-CM

## 2021-10-22 PROCEDURE — 99214 OFFICE O/P EST MOD 30 MIN: CPT | Performed by: NURSE PRACTITIONER

## 2021-10-22 NOTE — PATIENT INSTRUCTIONS
1.  Continue using CPAP therapy nightly for optimal benefit.  Order was placed for mask and supplies through patient's preferred DME company [employer].  Patient also has a malfunctioning power adapter and is requesting replacement.  Order was placed for that as well.  Follow-up will be 1 year or sooner if needed.  Clean mask and supplies frequently with mild soap and water or vinegar and water solution.

## 2021-10-22 NOTE — PROGRESS NOTES
Chief Complaint   Patient presents with   • Follow-Up     MILLA       HPI:  Buddy Dash is a 46 y.o. year old male here today for follow-up on MILLA.  Last seen 10/23/2020 by Dr. Gee.  Patient original diagnosis was severe MILLA with an AHI of 69.4 and O2 jony of 76%.  Currently is on auto CPAP at 5 to 14 cm/H2O.    Compliance report was reviewed and does show 100% usage with an average time of 7 hours and 33 minutes and a resultant AHI of 0.4.  Patient remains on auto CPAP at 5 to 14 cm/H2O.  Is currently using a ResMed device.  Device was ordered 4/26/2017.    The patient reports improved symptoms using positive airway pressure.  Has experienced no significant problems with mask fit, mask leak, pressure tolerance, excessive airway dryness, nasal congestion, aerophagia, or chest pain.     Significant comorbidities and modifying factors include essential hypertension, ankle surgery, increased BMI, migraines, spinal surgery, and never smoker.      ROS: As per HPI and otherwise negative if not stated.    Past Medical History:   Diagnosis Date   • Back pain    • Bruxism    • Pain    • Sleep apnea     uses CPAP   • Urinary bladder disorder     low flow       Past Surgical History:   Procedure Laterality Date   • ANKLE ARTHROSCOPY Left 7/8/2019    Procedure: ARTHROSCOPY, ANKLE;  Surgeon: David Mccoy M.D.;  Location: [facility];  Service: Orthopedics   • ANKLE LIGAMENT RECONSTRUCTION Left 7/8/2019    Procedure: RECONSTRUCTION, LIGAMENT, ANKLE- LATERAL  ;  Surgeon: David Mccoy M.D.;  Location: [facility];  Service: Orthopedics   • LUMBAR LAMINECTOMY DISKECTOMY  8/31/2012    Performed by WILLI ALVAREZ at [facility]   • LUMBAR LAMINECTOMY DISKECTOMY  8/22/2012    Performed by WILLI ALVAREZ at [facility]   • ACL RECONSTRUCTION Right 1995   • ARTHROSCOPY, KNEE     • LAMINOTOMY     • LUMBAR LAMINECTOMY DISKECTOMY      left       Family History   Problem  Relation Age of Onset   • Sleep Apnea Mother    • Hypertension Paternal Grandmother        Allergies as of 10/22/2021 - Reviewed 10/22/2021   Allergen Reaction Noted   • Nkda [no known drug allergy]  08/31/2012        Vitals:  /80 (BP Location: Left arm, Patient Position: Sitting, BP Cuff Size: Large adult)   Pulse 61   Resp 16   Ht 1.829 m (6')   Wt 110 kg (242 lb 8 oz)   SpO2 96%     Current medications as of today   Current Outpatient Medications   Medication Sig Dispense Refill   • therapeutic multivitamin-minerals (THERAGRAN-M) Tab Take 1 Tab by mouth every day.     • losartan (COZAAR) 25 MG Tab Take 25 mg by mouth. (Patient not taking: Reported on 10/22/2021)  0     No current facility-administered medications for this visit.         Physical Exam:   Gen:           Alert and oriented, No apparent distress. Mood and affect appropriate, normal interaction with examiner.  Eyes:          PERRL, EOM intact, sclere white, conjunctive moist.  Ears:          Not examined.   Hearing:     Grossly intact.  Nose:          Normal, no lesions or deformities.  Dentition:    Good dentition.  Oropharynx:   Tongue normal, posterior pharynx without erythema or exudate.  Neck:        Supple, trachea midline, no masses.  Respiratory Effort: No intercostal retractions or use of accessory muscles.   Lung Auscultation:      Clear to auscultation bilaterally; no rales, rhonchi or wheezing.  CV:            Regular rate and rhythm. No murmurs, rubs or gallops.  Abd:           Not examined.   Lymphadenopathy: Not examined.  Gait and Station: Normal.  Digits and Nails: No clubbing, cyanosis, petechiae, or nodes.   Cranial Nerves: II-XII grossly intact.  Skin:        No rashes, lesions or ulcers noted.               Ext:           No cyanosis or edema.      Assessment:  1. MILLA (obstructive sleep apnea)  DME Mask and Supplies    DME Other    CANCELED: DME Other           Plan:  1.  Continue using CPAP therapy nightly for optimal  benefit.  Order was placed for mask and supplies through patient's preferred DME company [employer].  Patient also has a malfunctioning power adapter and is requesting replacement.  Order was placed for that as well.  Follow-up will be 1 year or sooner if needed.  Clean mask and supplies frequently with mild soap and water or vinegar and water solution.    Please note that this dictation was created using voice recognition software. I have made every reasonable attempt to correct obvious errors, but it is possible there are errors of grammar and possibly content that I did not discover before finalizing the note.

## 2022-05-12 ENCOUNTER — HOSPITAL ENCOUNTER (OUTPATIENT)
Dept: RADIOLOGY | Facility: MEDICAL CENTER | Age: 47
End: 2022-05-12
Attending: NURSE PRACTITIONER
Payer: COMMERCIAL

## 2022-05-12 DIAGNOSIS — M79.605 ACUTE PAIN OF LEFT LOWER EXTREMITY: ICD-10-CM

## 2022-05-12 DIAGNOSIS — M25.572 ACUTE LEFT ANKLE PAIN: ICD-10-CM

## 2022-05-12 DIAGNOSIS — R60.0 EDEMA OF LEFT LOWER EXTREMITY: ICD-10-CM

## 2022-05-12 PROCEDURE — 93971 EXTREMITY STUDY: CPT | Mod: LT

## 2022-11-09 ENCOUNTER — OFFICE VISIT (OUTPATIENT)
Dept: SLEEP MEDICINE | Facility: MEDICAL CENTER | Age: 47
End: 2022-11-09
Payer: COMMERCIAL

## 2022-11-09 VITALS
BODY MASS INDEX: 31.83 KG/M2 | RESPIRATION RATE: 16 BRPM | WEIGHT: 235 LBS | SYSTOLIC BLOOD PRESSURE: 142 MMHG | HEIGHT: 72 IN | OXYGEN SATURATION: 97 % | DIASTOLIC BLOOD PRESSURE: 84 MMHG | HEART RATE: 93 BPM

## 2022-11-09 DIAGNOSIS — G47.33 OSA (OBSTRUCTIVE SLEEP APNEA): ICD-10-CM

## 2022-11-09 PROCEDURE — 99213 OFFICE O/P EST LOW 20 MIN: CPT | Performed by: NURSE PRACTITIONER

## 2022-11-09 NOTE — PROGRESS NOTES
No chief complaint on file.      HPI:  Buddy Dash is a 47 y.o. year old male here today for follow-up on today.  Last seen by me on 10/22/2021. Patient original diagnosis was severe MILLA with an AHI of 69.4 and O2 jony of 76%.  Currently is on auto CPAP at 5 to 14 cm/H2O. Device was ordered 4/26/2017.    The patient reports improved symptoms using positive airway pressure.  Has experienced no significant problems with mask fit, mask leak, pressure tolerance, excessive airway dryness, nasal congestion, aerophagia, or chest pain.     Patient did not bring ST card in for compliance review, but continues to use CPAP nightly.  Patient is requesting order for new CPAP device as his is greater than 5 years old and beginning to malfunction.  He would also like to purchase out-of-pocket a travel CPAP.  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    ROS: As per HPI and otherwise negative if not stated.    Past Medical History:   Diagnosis Date    Back pain     Bruxism     Pain     Sleep apnea     uses CPAP    Urinary bladder disorder     low flow       Past Surgical History:   Procedure Laterality Date    ANKLE ARTHROSCOPY Left 7/8/2019    Procedure: ARTHROSCOPY, ANKLE;  Surgeon: David Mccoy M.D.;  Location: SURGERY [facility];  Service: Orthopedics    ANKLE LIGAMENT RECONSTRUCTION Left 7/8/2019    Procedure: RECONSTRUCTION, LIGAMENT, ANKLE- LATERAL  ;  Surgeon: David Mccoy M.D.;  Location: SURGERY [facility];  Service: Orthopedics    LUMBAR LAMINECTOMY DISKECTOMY  8/31/2012    Performed by WILLI ALVAREZ at SURGERY [facility]    LUMBAR LAMINECTOMY DISKECTOMY  8/22/2012    Performed by WILLI ALVAREZ at SURGERY [facility]    RECONSTRUCTION, KNEE, ACL Right 1995    ARTHROSCOPY, KNEE      LAMINOTOMY      LUMBAR LAMINECTOMY DISKECTOMY      left       Family History   Problem  Relation Age of Onset    Sleep Apnea Mother     Hypertension Paternal Grandmother        Allergies as of 11/09/2022 - Reviewed 05/23/2022   Allergen Reaction Noted    Nkda [no known drug allergy]  08/31/2012        Vitals:  There were no vitals taken for this visit.    Current medications as of today   Current Outpatient Medications   Medication Sig Dispense Refill    therapeutic multivitamin-minerals (THERAGRAN-M) Tab Take 1 Tab by mouth every day.       No current facility-administered medications for this visit.         Physical Exam:   Gen:           Alert and oriented, No apparent distress. Mood and affect appropriate, normal interaction with examiner.  Eyes:          PERRL, EOM intact, sclere white, conjunctive moist.  Ears:          Not examined.   Hearing:     Grossly intact.  Nose:          Normal, no lesions or deformities.  Dentition:    Good dentition.  Oropharynx:   Tongue normal, posterior pharynx without erythema or exudate.  Neck:        Supple, trachea midline, no masses.  Respiratory Effort: No intercostal retractions or use of accessory muscles.   Lung Auscultation:      Clear to auscultation bilaterally; no rales, rhonchi or wheezing.  CV:            Regular rate and rhythm. No murmurs, rubs or gallops.  Abd:           Not examined.   Lymphadenopathy: Not examined.  Gait and Station: Normal.  Digits and Nails: No clubbing, cyanosis, petechiae, or nodes.   Cranial Nerves: II-XII grossly intact.  Skin:        No rashes, lesions or ulcers noted.               Ext:           No cyanosis or edema.      Assessment:  1. MILLA (obstructive sleep apnea)            Plan:  Patient is using and benefiting from CPAP therapy at this time.  Patient requesting order for new CPAP.  Order placed and sent to his current [employer] company.  Recommend patient follow-up between day 31 and 90 for first compliance on new CPAP.  Order also provided for travel CPAP to be used when traveling with family.    Please note that this  dictation was created using voice recognition software. I have made every reasonable attempt to correct obvious errors, but it is possible there are errors of grammar and possibly content that I did not discover before finalizing the note.

## 2024-04-15 ENCOUNTER — TELEPHONE (OUTPATIENT)
Dept: SLEEP MEDICINE | Facility: MEDICAL CENTER | Age: 49
End: 2024-04-15
Payer: COMMERCIAL

## 2024-04-15 NOTE — TELEPHONE ENCOUNTER
Pt LVM requesting an order for a new cpap machine but he couldn't connect with [facility] for a number of reason. But due to this [facility] is stating that they need updated information.     I called pt to inform him that before any orders can be place the pt would need to be seen in the office. I informed the pt that I see that he was last seen back on 11/9/2022 with Liang Kidd and due to insurance he does need to be seen. However the pt stated that he has been seen sooner then that. I asked the pt if it was with us or a different office. But stated that he was seen in our office. Pt stated that he was going to check his records because he has information from that appointment and was going to give me a call back regarding this.

## 2025-02-19 ENCOUNTER — OFFICE VISIT (OUTPATIENT)
Dept: SLEEP MEDICINE | Facility: MEDICAL CENTER | Age: 50
End: 2025-02-19
Payer: COMMERCIAL

## 2025-02-19 VITALS
OXYGEN SATURATION: 95 % | HEIGHT: 72 IN | BODY MASS INDEX: 33.59 KG/M2 | WEIGHT: 248 LBS | HEART RATE: 76 BPM | SYSTOLIC BLOOD PRESSURE: 130 MMHG | RESPIRATION RATE: 16 BRPM | DIASTOLIC BLOOD PRESSURE: 90 MMHG

## 2025-02-19 DIAGNOSIS — R09.81 NASAL CONGESTION: ICD-10-CM

## 2025-02-19 DIAGNOSIS — G47.33 OSA ON CPAP: ICD-10-CM

## 2025-02-19 PROCEDURE — 99212 OFFICE O/P EST SF 10 MIN: CPT

## 2025-02-19 PROCEDURE — 99213 OFFICE O/P EST LOW 20 MIN: CPT

## 2025-02-19 PROCEDURE — 3080F DIAST BP >= 90 MM HG: CPT

## 2025-02-19 PROCEDURE — 3075F SYST BP GE 130 - 139MM HG: CPT

## 2025-02-19 RX ORDER — LOSARTAN POTASSIUM 25 MG/1
25 TABLET ORAL DAILY
COMMUNITY

## 2025-02-19 NOTE — PROGRESS NOTES
[facility] Follow-up Visit    CC:   Chief Complaint   Patient presents with    Apnea     Last Office Visit 22 with AMERICA Kidd     1st Compliance: No     DME: [facility]     PAP/O2/OAT: CPAP     Wireless Data? NO           HPI:  Buddy Dash is a 49 y.o.male present today for ongoing management of MILLA.  Patient was last seen 2022 by Liang Kidd.  At that time he wanted to get a new CPAP machine but he had difficulty urinating with his DME to get the new machine and eventually order .  Patient has continued to use his CPAP nightly with good tolerance.  Patient reports he cannot sleep without it and he wakes up feeling refreshed and has restorative sleep.  He does feel that the pressure is not always strong enough and that it takes a while to ramp up to a comfortable pressure.  Over the last couple of months patient does feel some sort of flap in the back of his throat and that when he is on his back it causes him to stop breathing despite use of the CPAP mask.  He does admit to some congestion as well as potential postnasal drip.  He has been trying saline rinses and he did start using that humidity function on his machine which he previously had never used but the issue continues.  Patient would like to move forward with getting a new CPAP machine at this time.    DME provider: [facility] [employer]   Device: airsense 10   Settings: auto CPAP 5-14  Mask: nasal pillows   Aerophagia: no  Snoring: no  Dry mouth: yes  Leak: no  Skin irritation: no  Chin strap: no      Sleep History  17 - PSG split night, AHI was 69.4. Recommended CPAP 6 or auto CPAP 5-12    Patient Active Problem List    Diagnosis Date Noted    Non-smoker 10/22/2020    Essential hypertension 10/18/2019    S/P surgical manipulation of ankle joint 10/18/2019    MILLA (obstructive sleep apnea) 2017    BMI 31.0-31.9,adult 2017    Migraine with aura and without status migrainosus, not intractable 2017     Radiculopathy 08/30/2012    Thoracic or lumbosacral neuritis or radiculitis, unspecified 08/22/2012    S/P spinal surgery 08/22/2012       Past Medical History:   Diagnosis Date    Back pain     Bruxism     Pain     Sleep apnea     uses CPAP    Urinary bladder disorder     low flow        Past Surgical History:   Procedure Laterality Date    ANKLE ARTHROSCOPY Left 07/08/2019    Procedure: ARTHROSCOPY, ANKLE;  Surgeon: David Camacho M.D.;  Location: SURGERY [facility];  Service: Orthopedics    ANKLE LIGAMENT RECONSTRUCTION Left 07/08/2019    Procedure: RECONSTRUCTION, LIGAMENT, ANKLE- LATERAL  ;  Surgeon: David Camacho M.D.;  Location: SURGERY [facility];  Service: Orthopedics    LUMBAR LAMINECTOMY DISKECTOMY  08/31/2012    Performed by WILLI ALVAREZ at SURGERY [facility]    LUMBAR LAMINECTOMY DISKECTOMY  08/22/2012    Performed by WILLI ALVAREZ at SURGERY [facility]    RECONSTRUCTION, KNEE, ACL Right 1995    ARTHROSCOPY, KNEE      LAMINOTOMY      LUMBAR LAMINECTOMY DISKECTOMY      left    ORIF, ANKLE  6/    Dr camacho did the surgery       Family History   Problem Relation Age of Onset    Sleep Apnea Mother     Hypertension Paternal Grandmother        Current Outpatient Medications   Medication Sig Dispense Refill    losartan (COZAAR) 25 MG Tab Take 25 mg by mouth every day.      therapeutic multivitamin-minerals (THERAGRAN-M) Tab Take 1 Tab by mouth every day.       No current facility-administered medications for this visit.        ALLERGIES: Nkda [no known drug allergy]    ROS  Constitutional: Denies fevers, Denies weight changes  Ears/Nose/Throat/Mouth: Denies nasal congestion or sore throat   Cardiovascular: Denies chest pain  Respiratory: Denies shortness of breath, Denies cough  Gastrointestinal/Hepatic: Denies nausea, vomiting  Sleep: see HPI      PHYSICAL EXAM  BP (!) 130/90 (BP Location: Left arm, Patient Position: Sitting, BP Cuff Size: Adult)   Pulse 76   Resp 16   Ht  1.829 m (6')   Wt 112 kg (248 lb)   SpO2 95%   BMI 33.63 kg/m²   Constitutional: Alert, no distress, well-groomed.  Skin: Warm, dry, good turgor, no rashes in visible areas.  Eye: Equal, round and reactive, conjunctiva clear, lids normal.  ENMT: Lips without lesions, good dentition, moist mucous membranes.  Neck: Trachea midline, no masses, no thyromegaly.  Respiratory: Unlabored respiratory effort, no cough.  MSK: Normal gait, moves all extremities.  Neuro: Grossly non-focal.   Psych: Alert and oriented x3, normal affect and mood.      Medical Decision Making   Assessment and Plan  The medical record was reviewed including Diagnostic and titration nocturnal polysomnogram's  and compliance reports .    Obstructive sleep apnea  - Compliance data reviewed showing 100% usage > 4hours in last 30 days. Average AHI 0.3 events/hour.  Leak 95th percentile 0  - Current Settings auto CPAP 5-14, 95th percentile pressure 11.1  - Pt continues to use and benefit from machine.     2. Nasal congestion  -Discussed continuing using and adjusting humidity settings.  Discussed trying a hybrid fullface or other fullface mask to see if sleeping with his mouth open is contributing to his complaints.  Also discussed using chinstrap or lip tape alternatively.  Patient does not want to try a fullface mask at this time.  Patient could try over-the-counter nondrowsy antihistamine such as Claritin or Zyrtec for a couple of weeks to determine if he sees a benefit.  Patient may also use over-the-counter nasal spray such as Flonase to see if this assists with his experience.  If problems persist despite these options referral was placed for ENT if patient wishes to seek further evaluation.  - Referral to ENT    MILLA/PAP EDUCATION:  - Clean equipment frequently, and get new mask and supplies as allowed by insurance and DME.   - Avoid driving a motor vehicle when drowsy  - Patients with MILLA are at increased risk of cardiovascular disease including  coronary artery disease, systemic arterial hypertension, pulmonary arterial hypertension, cardiac arrythmias, and stroke. Positive airway pressure will favorably impact many of the adverse conditions and effects provoked by MILLA.     PLAN:   - Order placed for mask and supplies   - Change settings to auto CPAP 10-14 based on patients report of the pressure not ramping up quickly enough and 95th percentile. Was unable to change patient's settings using his SD card. Advised patient to return with his machine to adjust settings. New machine was ordered with the updated settings.   - Advised to reach out via [employer] with questions       Return in about 3 months (around 5/19/2025) for compliance check for new machine .   - Recommend an earlier appointment, if significant treatment barriers develop.  - Patient to follow up with the appropriate healthcare practitioners for all other medical problems and issues.    Please note portions of this record was created using voice recognition software. I have made every reasonable attempt to correct obvious errors, but I expect that there are errors of grammar and possibly content I did not discover before finalizing the note.

## 2025-02-19 NOTE — Clinical Note
REFERRAL APPROVAL NOTICE         Sent on February 19, 2025                   Buddy Dash  [address] [facility] [address]                   Dear Mr. Dash,    After a careful review of the medical information and benefit coverage, [facility] has processed your referral. See below for additional details.    If applicable, you must be actively enrolled with your insurance for coverage of the authorized service. If you have any questions regarding your coverage, please contact your insurance directly.    REFERRAL INFORMATION   Referral #:  [id number]  Referred-To Provider    Referred-By Provider:  Otolaryngology    TITO Taylor JEREMY D      [address] [place] [id number]  [phone] [address] [facility] [address]  [phone]    Referral Start Date:  02/19/2025  Referral End Date:   02/19/2026             SCHEDULING  If you do not already have an appointment, please call [phone] to make an appointment.     MORE INFORMATION  If you do not already have a [facility] account, sign up at: [email or web address]  You can access your medical information, make appointments, see lab results, billing information, and more.  If you have questions regarding this referral, please contact  the [facility] Referrals department at:             [phone]. Monday - Friday 8:00AM - 5:00PM.     Sincerely,    [facility]

## 2025-05-19 ENCOUNTER — OFFICE VISIT (OUTPATIENT)
Dept: SLEEP MEDICINE | Facility: MEDICAL CENTER | Age: 50
End: 2025-05-19
Payer: COMMERCIAL

## 2025-05-19 VITALS
SYSTOLIC BLOOD PRESSURE: 122 MMHG | HEART RATE: 63 BPM | OXYGEN SATURATION: 95 % | HEIGHT: 72 IN | BODY MASS INDEX: 32.91 KG/M2 | DIASTOLIC BLOOD PRESSURE: 84 MMHG | RESPIRATION RATE: 16 BRPM | WEIGHT: 243 LBS

## 2025-05-19 DIAGNOSIS — G47.33 OSA ON CPAP: Primary | ICD-10-CM

## 2025-05-19 PROCEDURE — 99214 OFFICE O/P EST MOD 30 MIN: CPT

## 2025-05-19 PROCEDURE — 3074F SYST BP LT 130 MM HG: CPT

## 2025-05-19 PROCEDURE — 3079F DIAST BP 80-89 MM HG: CPT

## 2025-05-19 PROCEDURE — 99213 OFFICE O/P EST LOW 20 MIN: CPT

## 2025-05-19 ASSESSMENT — PATIENT HEALTH QUESTIONNAIRE - PHQ9: CLINICAL INTERPRETATION OF PHQ2 SCORE: 0

## 2025-05-19 NOTE — PROGRESS NOTES
[facility] Follow-up Visit    CC:   Chief Complaint   Patient presents with    Follow-Up     Last Office Visit 2/19/2025 with Marielle EAGLE   auto CPAP 10-14          HPI:    Buddy aDsh is a 50 y.o.male present today for ongoing management of MILLA On CPAP. Patient was last seen by sleep medicine 2/19/25 by myself. The primary reason for today's visit is first compliance visit after receiving new PAP machine. Patient uses the machine nightly and reports benefit - patient uses air mini when travelling. Patient reports no significant issues with PAP machine. Patient denies aerophagia, residual snoring, dry mouth , and excessive leak . Generally sleep quality is good. Patient has been using KT tape to tape his mouth to prevent air coming out his mouth while sleeping. His wife asks if this is a safety concern.     He bought and air mini but found that it was very loud preventing him and his wife from sleeping. He found that if he got [employer]med brand mask and tubing it is a little quieter.     DME provider: [facility]  Device: airsense 10  Settings: APAP 10-14  Mask: nasal pillow   Chin strap/lip strap: yes, lip tape         Sleep History  4/21/17 - PSG split night, AHI 69.4, REM AHI 80, supine , jony O2 76%, time with oxygen less than 89% 36.8% of sleep time.  CPAP was tried from 5-10.  Rec: CPAP 6  10/19/2018-OPO on CPAP 12, jony O2 84%, time less than 88% 5.3 minutes.  Recommendation: Consider increasing based pressure due to borderline CARLOS and low average O2 saturation  11/13/2018-OPO on CPAP 14, jony O2 85%, O2 sat less than 88% for 2 minutes. Recommendation: continue current CPAP pressure    Patient Active Problem List    Diagnosis Date Noted    Non-smoker 10/22/2020    Essential hypertension 10/18/2019    S/P surgical manipulation of ankle joint 10/18/2019    MILLA (obstructive sleep apnea) 02/24/2017    BMI 31.0-31.9,adult 02/24/2017    Migraine with aura and without status migrainosus, not  "intractable 01/31/2017    Radiculopathy 08/30/2012    Thoracic or lumbosacral neuritis or radiculitis, unspecified 08/22/2012    S/P spinal surgery 08/22/2012       Past Medical History[1]     Past Surgical History[2]    Family History   Problem Relation Age of Onset    Sleep Apnea Mother     Hypertension Paternal Grandmother        Current Medications[3]     ALLERGIES: Nkda [no known drug allergy]    ROS  Constitutional: Denies fevers, Denies weight changes  Ears/Nose/Throat/Mouth: Denies nasal congestion or sore throat   Cardiovascular: Denies chest pain  Respiratory: Denies shortness of breath, Denies cough  Gastrointestinal/Hepatic: Denies nausea, vomiting  Sleep: see HPI      PHYSICAL EXAM  /84 (BP Location: Left arm, Patient Position: Sitting, BP Cuff Size: Adult)   Pulse 63   Resp 16   Ht 1.822 m (5' 11.75\") Comment: per patient  Wt 110 kg (243 lb) Comment: per patient  SpO2 95%   BMI 33.19 kg/m²   Constitutional: Alert, no distress, well-groomed.  Skin: Warm, dry, good turgor, no rashes in visible areas.  Eye: Equal, round and reactive, conjunctiva clear, lids normal.  ENMT: Lips without lesions, good dentition, moist mucous membranes.  Neck: Trachea midline, no masses, no thyromegaly.  Respiratory: Unlabored respiratory effort, no cough.  MSK: Normal gait, moves all extremities.  Neuro: Grossly non-focal.   Psych: Alert and oriented x3, normal affect and mood.      Medical Decision Making   Assessment and Plan  The medical record was reviewed including Diagnostic and titration nocturnal polysomnogram's  and compliance reports .    Obstructive sleep apnea  - Compliance data reviewed showing 83% usage > 4hours in last 30 days. Average AHI 0.2 events/hour.  Leak 95th percentile 0.2. Pressure 95% 13.1  - Current Settings APAP 10-14. Recommend continuing current settings.   - Pt continues to use and benefit from machine.   - Orders placed for mask and supplies   - Recommended using tape specifically " made for the mouth instead of KT tape  - Advised to reach out via MyChart with questions     MILLA/PAP EDUCATION:  - Clean equipment frequently, and get new mask and supplies as allowed by insurance and DME.   - Avoid driving a motor vehicle when drowsy  - Patients with MILLA are at increased risk of cardiovascular disease including coronary artery disease, systemic arterial hypertension, pulmonary arterial hypertension, cardiac arrythmias, and stroke. Positive airway pressure will favorably impact many of the adverse conditions and effects provoked by MILLA.       Return in about 1 year (around 5/19/2026), or if symptoms worsen or fail to improve.   - Recommend an earlier appointment, if significant treatment barriers develop.  - Patient to follow up with the appropriate healthcare practitioners for all other medical problems and issues.    Please note portions of this record was created using voice recognition software. I have made every reasonable attempt to correct obvious errors, but I expect that there are errors of grammar and possibly content I did not discover before finalizing the note.           [1]   Past Medical History:  Diagnosis Date    Back pain     Bruxism     Pain     Sleep apnea     uses CPAP    Urinary bladder disorder     low flow   [2]   Past Surgical History:  Procedure Laterality Date    ANKLE ARTHROSCOPY Left 07/08/2019    Procedure: ARTHROSCOPY, ANKLE;  Surgeon: David Mccoy M.D.;  Location: [facility];  Service: Orthopedics    ANKLE LIGAMENT RECONSTRUCTION Left 07/08/2019    Procedure: RECONSTRUCTION, LIGAMENT, ANKLE- LATERAL  ;  Surgeon: David Mccoy M.D.;  Location: [facility];  Service: Orthopedics    LUMBAR LAMINECTOMY DISKECTOMY  08/31/2012    Performed by WILLI ALVAREZ at SURGERY [facility]    LUMBAR LAMINECTOMY DISKECTOMY  08/22/2012    Performed by WILLI ALVAREZ at [facility]    RECONSTRUCTION, KNEE, ACL Right 1995    ARTHROSCOPY,  KNEE      LAMINOTOMY      LUMBAR LAMINECTOMY DISKECTOMY      left    ORIF, ANKLE  6/    Dr camacho did the surgery   [3]   Current Outpatient Medications   Medication Sig Dispense Refill    losartan (COZAAR) 25 MG Tab Take 25 mg by mouth every day.      therapeutic multivitamin-minerals (THERAGRAN-M) Tab Take 1 Tab by mouth every day.       No current facility-administered medications for this visit.

## 2025-05-30 ENCOUNTER — APPOINTMENT (OUTPATIENT)
Dept: URBAN - NONMETROPOLITAN AREA CLINIC 1 | Facility: CLINIC | Age: 50
Setting detail: DERMATOLOGY
End: 2025-05-30

## 2025-05-30 DIAGNOSIS — L91.8 OTHER HYPERTROPHIC DISORDERS OF THE SKIN: ICD-10-CM

## 2025-05-30 DIAGNOSIS — Z85.828 PERSONAL HISTORY OF OTHER MALIGNANT NEOPLASM OF SKIN: ICD-10-CM

## 2025-05-30 DIAGNOSIS — L73.8 OTHER SPECIFIED FOLLICULAR DISORDERS: ICD-10-CM

## 2025-05-30 DIAGNOSIS — B07.8 OTHER VIRAL WARTS: ICD-10-CM

## 2025-05-30 DIAGNOSIS — L82.1 OTHER SEBORRHEIC KERATOSIS: ICD-10-CM

## 2025-05-30 DIAGNOSIS — Z12.83 ENCOUNTER FOR SCREENING FOR MALIGNANT NEOPLASM OF SKIN: ICD-10-CM

## 2025-05-30 DIAGNOSIS — L81.4 OTHER MELANIN HYPERPIGMENTATION: ICD-10-CM

## 2025-05-30 PROCEDURE — ? COUNSELING

## 2025-05-30 PROCEDURE — ? NOTED ON EXAM BUT NOT TREATED

## 2025-05-30 PROCEDURE — 99203 OFFICE O/P NEW LOW 30 MIN: CPT

## 2025-05-30 ASSESSMENT — LOCATION ZONE DERM
LOCATION ZONE: TRUNK
LOCATION ZONE: AXILLAE
LOCATION ZONE: LEG
LOCATION ZONE: TOE
LOCATION ZONE: FACE

## 2025-05-30 ASSESSMENT — LOCATION DETAILED DESCRIPTION DERM
LOCATION DETAILED: RIGHT SUPERIOR MEDIAL UPPER BACK
LOCATION DETAILED: RIGHT INFERIOR MEDIAL UPPER BACK
LOCATION DETAILED: LEFT PROXIMAL POSTERIOR THIGH
LOCATION DETAILED: RIGHT MEDIAL PLANTAR 1ST TOE
LOCATION DETAILED: LEFT CENTRAL MALAR CHEEK
LOCATION DETAILED: LEFT LATERAL MALAR CHEEK
LOCATION DETAILED: LEFT AXILLARY VAULT

## 2025-05-30 ASSESSMENT — LOCATION SIMPLE DESCRIPTION DERM
LOCATION SIMPLE: RIGHT UPPER BACK
LOCATION SIMPLE: LEFT AXILLARY VAULT
LOCATION SIMPLE: LEFT CHEEK
LOCATION SIMPLE: LEFT POSTERIOR THIGH
LOCATION SIMPLE: PLANTAR SURFACE OF RIGHT 1ST TOE

## (undated) DEVICE — CANISTER SUCTION 3000ML MECHANICAL FILTER AUTO SHUTOFF MEDI-VAC NONSTERILE LF DISP  (40EA/CA)

## (undated) DEVICE — BANDAGE ELASTIC 6 HONEYCOMB - 6X5YD LF (20/CA)"

## (undated) DEVICE — TUBING CLEARLINK DUO-VENT - C-FLO (48EA/CA)

## (undated) DEVICE — SODIUM CHL. IRRIGATION 0.9% 3000ML (4EA/CA 65CA/PF)

## (undated) DEVICE — CHLORAPREP 26 ML APPLICATOR - ORANGE TINT(25/CA)

## (undated) DEVICE — KIT ANESTHESIA W/CIRCUIT & 3/LT BAG W/FILTER (20EA/CA)

## (undated) DEVICE — SLEEVE, VASO, THIGH, MED

## (undated) DEVICE — GLOVE BIOGEL SZ 8 SURGICAL PF LTX - (50PR/BX 4BX/CA)

## (undated) DEVICE — GLOVE BIOGEL PI INDICATOR SZ 8.0 SURGICAL PF LF -(50/BX 4BX/CA)

## (undated) DEVICE — MASK, LARYNGEAL AIRWAY #4

## (undated) DEVICE — TUBE CONNECTING SUCTION - CLEAR PLASTIC STERILE 72 IN (50EA/CA)

## (undated) DEVICE — DRAPE LARGE 3 QUARTER - (20/CA)

## (undated) DEVICE — GLOVE SZ 7 BIOGEL PI MICRO - PF LF (50PR/BX 4BX/CA)

## (undated) DEVICE — GOWN WARMING STANDARD FLEX - (30/CA)

## (undated) DEVICE — GLOVE BIOGEL INDICATOR SZ 6.5 SURGICAL PF LTX - (50PR/BX 4BX/CA)

## (undated) DEVICE — SENSOR SPO2 NEO LNCS ADHESIVE (20/BX) SEE USER NOTES

## (undated) DEVICE — SHAVER 3.5 AGGRESSIVE + FORMLA (5EA/SP)

## (undated) DEVICE — BLOCK

## (undated) DEVICE — PROTECTOR ULNA NERVE - (36PR/CA)

## (undated) DEVICE — GLOVE BIOGEL INDICATOR SZ 8.5 SURGICAL PF LTX - (50/BX 4BX/CA)

## (undated) DEVICE — NEPTUNE 4 PORT MANIFOLD - (20/PK)

## (undated) DEVICE — DRESSING 3X8 ADAPTIC GAUZE - NON-ADHERING (36/PK 6PK/BX)

## (undated) DEVICE — GLOVE BIOGEL ECLIPSE  PF LATEX SIZE 6.5 (50PR/BX)

## (undated) DEVICE — SUTURE GENERAL

## (undated) DEVICE — SET EXTENSION WITH 2 PORTS (48EA/CA) ***PART #2C8610 IS A SUBSTITUTE*****

## (undated) DEVICE — SPLINT OCL

## (undated) DEVICE — PACK ARTHROSCOPY - (2EA//CA)

## (undated) DEVICE — KIT ROOM DECONTAMINATION

## (undated) DEVICE — BLADE SURGICAL #15 - (50/BX 3BX/CA)

## (undated) DEVICE — POUCH FLUID COLLECTION INVISISHIELD - (10/BX)

## (undated) DEVICE — NEEDLE SAFETY 18 GA X 1 1/2 IN (100EA/BX)

## (undated) DEVICE — GOWN SURGEONS X-LARGE - DISP. (30/CA)

## (undated) DEVICE — SUCTION INSTRUMENT YANKAUER BULBOUS TIP W/O VENT (50EA/CA)

## (undated) DEVICE — DRESSING TRANSPARENT FILM TEGADERM 4 X 4.75" (50EA/BX)"

## (undated) DEVICE — SET LEADWIRE 5 LEAD BEDSIDE DISPOSABLE ECG (1SET OF 5/EA)

## (undated) DEVICE — WRAP CO-FLEX 4IN X 5YD STERIL - SELF-ADHERENT (18/CA)

## (undated) DEVICE — MASK ANESTHESIA ADULT  - (100/CA)

## (undated) DEVICE — TUBING PATIENT W/CONNECTOR REDEUCE (1EA)

## (undated) DEVICE — PADDING CAST 6 IN STERILE - 6 X 4 YDS (24/CA)

## (undated) DEVICE — SUTURE 3-0 ETHILON PS-1 (36PK/BX)

## (undated) DEVICE — ELECTRODE 850 FOAM ADHESIVE - HYDROGEL RADIOTRNSPRNT (50/PK)

## (undated) DEVICE — HEAD HOLDER JUNIOR/ADULT

## (undated) DEVICE — GLOVE BIOGEL SZ 6 PF LATEX - (50EA/BX 4BX/CA)

## (undated) DEVICE — LACTATED RINGERS INJ 1000 ML - (14EA/CA 60CA/PF)

## (undated) DEVICE — DRAPE ARTHROSCOPE (ACL) - (10/CA)

## (undated) DEVICE — GLOVE BIOGEL PI INDICATOR SZ 7.0 SURGICAL PF LF - (50/BX 4BX/CA)

## (undated) DEVICE — ELECTRODE DUAL RETURN W/ CORD - (50/PK)

## (undated) DEVICE — SUTURE 3-0 VICRYL PLUS SH - 8X 18 INCH (12/BX)

## (undated) DEVICE — TUBING PUMP WITH CONNECTOR REDEUCE (1EA)

## (undated) DEVICE — SYRINGE 30 ML LL (56/BX)

## (undated) DEVICE — PACK LOWER EXTREMITY - (2/CA)

## (undated) DEVICE — GLOVE BIOGEL ECLIPSE PF LATEX SIZE 8.0  (50PR/BX)

## (undated) DEVICE — TOURNIQUET CUFF 34 X 4 ONE PORT DISP - STERILE (10/BX)